# Patient Record
Sex: FEMALE | Race: WHITE | Employment: OTHER | URBAN - NONMETROPOLITAN AREA
[De-identification: names, ages, dates, MRNs, and addresses within clinical notes are randomized per-mention and may not be internally consistent; named-entity substitution may affect disease eponyms.]

---

## 2021-07-10 ENCOUNTER — HOSPITAL ENCOUNTER (INPATIENT)
Age: 76
LOS: 3 days | Discharge: HOME | DRG: 439 | End: 2021-07-13
Attending: FAMILY MEDICINE | Admitting: INTERNAL MEDICINE
Payer: MEDICARE

## 2021-07-10 ENCOUNTER — APPOINTMENT (OUTPATIENT)
Dept: CT IMAGING | Age: 76
DRG: 439 | End: 2021-07-10
Payer: MEDICARE

## 2021-07-10 DIAGNOSIS — I47.1 SVT (SUPRAVENTRICULAR TACHYCARDIA) (HCC): ICD-10-CM

## 2021-07-10 DIAGNOSIS — K85.00 IDIOPATHIC ACUTE PANCREATITIS, UNSPECIFIED COMPLICATION STATUS: Primary | ICD-10-CM

## 2021-07-10 LAB
-: ABNORMAL
ABSOLUTE EOS #: 0.1 K/UL (ref 0–0.4)
ABSOLUTE IMMATURE GRANULOCYTE: ABNORMAL K/UL (ref 0–0.3)
ABSOLUTE LYMPH #: 2 K/UL (ref 1–4.8)
ABSOLUTE MONO #: 1.2 K/UL (ref 0–1)
ALBUMIN SERPL-MCNC: 4.3 G/DL (ref 3.5–5.2)
ALBUMIN/GLOBULIN RATIO: ABNORMAL (ref 1–2.5)
ALP BLD-CCNC: 99 U/L (ref 35–104)
ALT SERPL-CCNC: 41 U/L (ref 5–33)
AMORPHOUS: ABNORMAL
ANION GAP SERPL CALCULATED.3IONS-SCNC: 15 MMOL/L (ref 9–17)
AST SERPL-CCNC: 89 U/L
BACTERIA: ABNORMAL
BASOPHILS # BLD: 0 % (ref 0–2)
BASOPHILS ABSOLUTE: 0.1 K/UL (ref 0–0.2)
BILIRUB SERPL-MCNC: 1.2 MG/DL (ref 0.3–1.2)
BILIRUBIN URINE: NEGATIVE
BUN BLDV-MCNC: 12 MG/DL (ref 8–23)
BUN/CREAT BLD: 17 (ref 9–20)
CALCIUM SERPL-MCNC: 9.4 MG/DL (ref 8.6–10.4)
CASTS UA: ABNORMAL /LPF
CHLORIDE BLD-SCNC: 104 MMOL/L (ref 98–107)
CO2: 22 MMOL/L (ref 20–31)
COLOR: ABNORMAL
COMMENT UA: ABNORMAL
CREAT SERPL-MCNC: 0.7 MG/DL (ref 0.5–0.9)
CRYSTALS, UA: ABNORMAL /HPF
DIFFERENTIAL TYPE: YES
EOSINOPHILS RELATIVE PERCENT: 1 % (ref 0–5)
EPITHELIAL CELLS UA: ABNORMAL /HPF
GFR AFRICAN AMERICAN: >60 ML/MIN
GFR NON-AFRICAN AMERICAN: >60 ML/MIN
GFR SERPL CREATININE-BSD FRML MDRD: ABNORMAL ML/MIN/{1.73_M2}
GFR SERPL CREATININE-BSD FRML MDRD: ABNORMAL ML/MIN/{1.73_M2}
GLUCOSE BLD-MCNC: 165 MG/DL (ref 70–99)
GLUCOSE URINE: NEGATIVE
HCT VFR BLD CALC: 43.3 % (ref 36–46)
HEMOGLOBIN: 14.5 G/DL (ref 12–16)
IMMATURE GRANULOCYTES: ABNORMAL %
INR BLD: 1.1
KETONES, URINE: NEGATIVE
LEUKOCYTE ESTERASE, URINE: NEGATIVE
LIPASE: >3000 U/L (ref 13–60)
LYMPHOCYTES # BLD: 11 % (ref 15–40)
MCH RBC QN AUTO: 28.9 PG (ref 26–34)
MCHC RBC AUTO-ENTMCNC: 33.5 G/DL (ref 31–37)
MCV RBC AUTO: 86.2 FL (ref 80–100)
MONOCYTES # BLD: 7 % (ref 4–8)
MUCUS: ABNORMAL
NITRITE, URINE: POSITIVE
NRBC AUTOMATED: ABNORMAL PER 100 WBC
OTHER OBSERVATIONS UA: ABNORMAL
PARTIAL THROMBOPLASTIN TIME: 30.4 SEC (ref 23.9–33.8)
PDW BLD-RTO: 13.1 % (ref 12.1–15.2)
PH UA: 5 (ref 5–8)
PLATELET # BLD: 578 K/UL (ref 140–450)
PLATELET ESTIMATE: ABNORMAL
PMV BLD AUTO: ABNORMAL FL (ref 6–12)
POTASSIUM SERPL-SCNC: 3.5 MMOL/L (ref 3.7–5.3)
PROTEIN UA: ABNORMAL
PROTHROMBIN TIME: 13.5 SEC (ref 11.5–14.2)
RBC # BLD: 5.03 M/UL (ref 4–5.2)
RBC # BLD: ABNORMAL 10*6/UL
RBC UA: ABNORMAL /HPF (ref 0–2)
RENAL EPITHELIAL, UA: ABNORMAL /HPF
SARS-COV-2, RAPID: NOT DETECTED
SEG NEUTROPHILS: 81 % (ref 47–75)
SEGMENTED NEUTROPHILS ABSOLUTE COUNT: 14.1 K/UL (ref 2.5–7)
SODIUM BLD-SCNC: 141 MMOL/L (ref 135–144)
SPECIFIC GRAVITY UA: 1.01 (ref 1–1.03)
SPECIMEN DESCRIPTION: NORMAL
TOTAL PROTEIN: 7.1 G/DL (ref 6.4–8.3)
TRICHOMONAS: ABNORMAL
TROPONIN INTERP: NORMAL
TROPONIN T: NORMAL NG/ML
TROPONIN, HIGH SENSITIVITY: 11 NG/L (ref 0–14)
TSH SERPL DL<=0.05 MIU/L-ACNC: 1.09 MIU/L (ref 0.3–5)
TURBIDITY: CLEAR
URINE HGB: NEGATIVE
UROBILINOGEN, URINE: NORMAL
WBC # BLD: 17.5 K/UL (ref 3.5–11)
WBC # BLD: ABNORMAL 10*3/UL
WBC UA: ABNORMAL /HPF
YEAST: ABNORMAL

## 2021-07-10 PROCEDURE — 84443 ASSAY THYROID STIM HORMONE: CPT

## 2021-07-10 PROCEDURE — 94761 N-INVAS EAR/PLS OXIMETRY MLT: CPT

## 2021-07-10 PROCEDURE — 87086 URINE CULTURE/COLONY COUNT: CPT

## 2021-07-10 PROCEDURE — 2580000003 HC RX 258: Performed by: FAMILY MEDICINE

## 2021-07-10 PROCEDURE — 85730 THROMBOPLASTIN TIME PARTIAL: CPT

## 2021-07-10 PROCEDURE — 85025 COMPLETE CBC W/AUTO DIFF WBC: CPT

## 2021-07-10 PROCEDURE — 6360000002 HC RX W HCPCS: Performed by: INTERNAL MEDICINE

## 2021-07-10 PROCEDURE — 99285 EMERGENCY DEPT VISIT HI MDM: CPT

## 2021-07-10 PROCEDURE — 87186 SC STD MICRODIL/AGAR DIL: CPT

## 2021-07-10 PROCEDURE — 1200000000 HC SEMI PRIVATE

## 2021-07-10 PROCEDURE — 80053 COMPREHEN METABOLIC PANEL: CPT

## 2021-07-10 PROCEDURE — 93005 ELECTROCARDIOGRAM TRACING: CPT | Performed by: FAMILY MEDICINE

## 2021-07-10 PROCEDURE — 6370000000 HC RX 637 (ALT 250 FOR IP): Performed by: INTERNAL MEDICINE

## 2021-07-10 PROCEDURE — 96376 TX/PRO/DX INJ SAME DRUG ADON: CPT

## 2021-07-10 PROCEDURE — 6360000004 HC RX CONTRAST MEDICATION: Performed by: FAMILY MEDICINE

## 2021-07-10 PROCEDURE — 87635 SARS-COV-2 COVID-19 AMP PRB: CPT

## 2021-07-10 PROCEDURE — 96374 THER/PROPH/DIAG INJ IV PUSH: CPT

## 2021-07-10 PROCEDURE — 87077 CULTURE AEROBIC IDENTIFY: CPT

## 2021-07-10 PROCEDURE — 84484 ASSAY OF TROPONIN QUANT: CPT

## 2021-07-10 PROCEDURE — 96375 TX/PRO/DX INJ NEW DRUG ADDON: CPT

## 2021-07-10 PROCEDURE — 87088 URINE BACTERIA CULTURE: CPT

## 2021-07-10 PROCEDURE — 6360000002 HC RX W HCPCS: Performed by: FAMILY MEDICINE

## 2021-07-10 PROCEDURE — 74177 CT ABD & PELVIS W/CONTRAST: CPT

## 2021-07-10 PROCEDURE — C9113 INJ PANTOPRAZOLE SODIUM, VIA: HCPCS | Performed by: FAMILY MEDICINE

## 2021-07-10 PROCEDURE — 81001 URINALYSIS AUTO W/SCOPE: CPT

## 2021-07-10 PROCEDURE — 83690 ASSAY OF LIPASE: CPT

## 2021-07-10 PROCEDURE — 85610 PROTHROMBIN TIME: CPT

## 2021-07-10 PROCEDURE — 2580000003 HC RX 258: Performed by: INTERNAL MEDICINE

## 2021-07-10 RX ORDER — MORPHINE SULFATE 2 MG/ML
1 INJECTION, SOLUTION INTRAMUSCULAR; INTRAVENOUS
Status: DISCONTINUED | OUTPATIENT
Start: 2021-07-10 | End: 2021-07-13 | Stop reason: HOSPADM

## 2021-07-10 RX ORDER — MORPHINE SULFATE 2 MG/ML
2 INJECTION, SOLUTION INTRAMUSCULAR; INTRAVENOUS EVERY 4 HOURS PRN
Status: DISCONTINUED | OUTPATIENT
Start: 2021-07-10 | End: 2021-07-10

## 2021-07-10 RX ORDER — LEVOTHYROXINE SODIUM 0.12 MG/1
125 TABLET ORAL DAILY
COMMUNITY

## 2021-07-10 RX ORDER — LEVOTHYROXINE AND LIOTHYRONINE 19; 4.5 UG/1; UG/1
30 TABLET ORAL DAILY
COMMUNITY

## 2021-07-10 RX ORDER — FENTANYL CITRATE 50 UG/ML
25 INJECTION, SOLUTION INTRAMUSCULAR; INTRAVENOUS ONCE
Status: COMPLETED | OUTPATIENT
Start: 2021-07-10 | End: 2021-07-10

## 2021-07-10 RX ORDER — SODIUM CHLORIDE 9 MG/ML
10 INJECTION INTRAVENOUS ONCE
Status: COMPLETED | OUTPATIENT
Start: 2021-07-10 | End: 2021-07-10

## 2021-07-10 RX ORDER — IBUPROFEN 200 MG
200 TABLET ORAL EVERY 6 HOURS PRN
COMMUNITY

## 2021-07-10 RX ORDER — SODIUM CHLORIDE 0.9 % (FLUSH) 0.9 %
5-40 SYRINGE (ML) INJECTION EVERY 12 HOURS SCHEDULED
Status: DISCONTINUED | OUTPATIENT
Start: 2021-07-10 | End: 2021-07-13 | Stop reason: HOSPADM

## 2021-07-10 RX ORDER — FENTANYL CITRATE 50 UG/ML
50 INJECTION, SOLUTION INTRAMUSCULAR; INTRAVENOUS ONCE
Status: COMPLETED | OUTPATIENT
Start: 2021-07-10 | End: 2021-07-10

## 2021-07-10 RX ORDER — SODIUM CHLORIDE 0.9 % (FLUSH) 0.9 %
5-40 SYRINGE (ML) INJECTION PRN
Status: DISCONTINUED | OUTPATIENT
Start: 2021-07-10 | End: 2021-07-13 | Stop reason: HOSPADM

## 2021-07-10 RX ORDER — LEVOTHYROXINE AND LIOTHYRONINE 19; 4.5 UG/1; UG/1
30 TABLET ORAL DAILY
Status: DISCONTINUED | OUTPATIENT
Start: 2021-07-10 | End: 2021-07-13 | Stop reason: HOSPADM

## 2021-07-10 RX ORDER — HYDRALAZINE HYDROCHLORIDE 20 MG/ML
10 INJECTION INTRAMUSCULAR; INTRAVENOUS EVERY 6 HOURS PRN
Status: DISCONTINUED | OUTPATIENT
Start: 2021-07-10 | End: 2021-07-13 | Stop reason: HOSPADM

## 2021-07-10 RX ORDER — PANTOPRAZOLE SODIUM 40 MG/10ML
40 INJECTION, POWDER, LYOPHILIZED, FOR SOLUTION INTRAVENOUS ONCE
Status: COMPLETED | OUTPATIENT
Start: 2021-07-10 | End: 2021-07-10

## 2021-07-10 RX ORDER — AMLODIPINE BESYLATE 10 MG/1
10 TABLET ORAL DAILY
COMMUNITY

## 2021-07-10 RX ORDER — SODIUM CHLORIDE 9 MG/ML
25 INJECTION, SOLUTION INTRAVENOUS PRN
Status: DISCONTINUED | OUTPATIENT
Start: 2021-07-10 | End: 2021-07-13 | Stop reason: HOSPADM

## 2021-07-10 RX ORDER — MORPHINE SULFATE 2 MG/ML
2 INJECTION, SOLUTION INTRAMUSCULAR; INTRAVENOUS
Status: DISCONTINUED | OUTPATIENT
Start: 2021-07-10 | End: 2021-07-13 | Stop reason: HOSPADM

## 2021-07-10 RX ORDER — ONDANSETRON 4 MG/1
4 TABLET, ORALLY DISINTEGRATING ORAL EVERY 8 HOURS PRN
Status: DISCONTINUED | OUTPATIENT
Start: 2021-07-10 | End: 2021-07-13 | Stop reason: HOSPADM

## 2021-07-10 RX ORDER — ACETAMINOPHEN 325 MG/1
650 TABLET ORAL EVERY 6 HOURS PRN
Status: DISCONTINUED | OUTPATIENT
Start: 2021-07-10 | End: 2021-07-13 | Stop reason: HOSPADM

## 2021-07-10 RX ORDER — LISINOPRIL 10 MG/1
10 TABLET ORAL DAILY
Status: DISCONTINUED | OUTPATIENT
Start: 2021-07-10 | End: 2021-07-13 | Stop reason: HOSPADM

## 2021-07-10 RX ORDER — POTASSIUM CHLORIDE 750 MG/1
40 TABLET, FILM COATED, EXTENDED RELEASE ORAL ONCE
Status: COMPLETED | OUTPATIENT
Start: 2021-07-10 | End: 2021-07-10

## 2021-07-10 RX ORDER — SODIUM CHLORIDE 9 MG/ML
INJECTION, SOLUTION INTRAVENOUS CONTINUOUS
Status: DISCONTINUED | OUTPATIENT
Start: 2021-07-10 | End: 2021-07-11

## 2021-07-10 RX ORDER — LISINOPRIL 10 MG/1
10 TABLET ORAL DAILY
COMMUNITY

## 2021-07-10 RX ORDER — MORPHINE SULFATE 2 MG/ML
2 INJECTION, SOLUTION INTRAMUSCULAR; INTRAVENOUS ONCE
Status: COMPLETED | OUTPATIENT
Start: 2021-07-10 | End: 2021-07-10

## 2021-07-10 RX ORDER — MORPHINE SULFATE 2 MG/ML
1 INJECTION, SOLUTION INTRAMUSCULAR; INTRAVENOUS EVERY 4 HOURS PRN
Status: DISCONTINUED | OUTPATIENT
Start: 2021-07-10 | End: 2021-07-10

## 2021-07-10 RX ORDER — ONDANSETRON 2 MG/ML
4 INJECTION INTRAMUSCULAR; INTRAVENOUS EVERY 6 HOURS PRN
Status: DISCONTINUED | OUTPATIENT
Start: 2021-07-10 | End: 2021-07-13 | Stop reason: HOSPADM

## 2021-07-10 RX ORDER — ACETAMINOPHEN 650 MG/1
650 SUPPOSITORY RECTAL EVERY 6 HOURS PRN
Status: DISCONTINUED | OUTPATIENT
Start: 2021-07-10 | End: 2021-07-13 | Stop reason: HOSPADM

## 2021-07-10 RX ORDER — AMLODIPINE BESYLATE 10 MG/1
10 TABLET ORAL DAILY
Status: DISCONTINUED | OUTPATIENT
Start: 2021-07-10 | End: 2021-07-13 | Stop reason: HOSPADM

## 2021-07-10 RX ORDER — POLYETHYLENE GLYCOL 3350 17 G/17G
17 POWDER, FOR SOLUTION ORAL DAILY PRN
Status: DISCONTINUED | OUTPATIENT
Start: 2021-07-10 | End: 2021-07-13 | Stop reason: HOSPADM

## 2021-07-10 RX ADMIN — IOPAMIDOL 75 ML: 755 INJECTION, SOLUTION INTRAVENOUS at 13:17

## 2021-07-10 RX ADMIN — PANTOPRAZOLE SODIUM 40 MG: 40 INJECTION, POWDER, FOR SOLUTION INTRAVENOUS at 12:22

## 2021-07-10 RX ADMIN — POTASSIUM CHLORIDE 40 MEQ: 750 TABLET, FILM COATED, EXTENDED RELEASE ORAL at 16:27

## 2021-07-10 RX ADMIN — FENTANYL CITRATE 50 MCG: 50 INJECTION, SOLUTION INTRAMUSCULAR; INTRAVENOUS at 12:19

## 2021-07-10 RX ADMIN — MORPHINE SULFATE 2 MG: 2 INJECTION, SOLUTION INTRAMUSCULAR; INTRAVENOUS at 16:24

## 2021-07-10 RX ADMIN — SODIUM CHLORIDE: 9 INJECTION, SOLUTION INTRAVENOUS at 16:27

## 2021-07-10 RX ADMIN — MORPHINE SULFATE 2 MG: 2 INJECTION, SOLUTION INTRAMUSCULAR; INTRAVENOUS at 20:29

## 2021-07-10 RX ADMIN — MORPHINE SULFATE 2 MG: 2 INJECTION, SOLUTION INTRAMUSCULAR; INTRAVENOUS at 17:55

## 2021-07-10 RX ADMIN — FENTANYL CITRATE 25 MCG: 50 INJECTION, SOLUTION INTRAMUSCULAR; INTRAVENOUS at 15:32

## 2021-07-10 RX ADMIN — PIPERACILLIN SODIUM AND TAZOBACTAM SODIUM 3375 MG: 3; .375 INJECTION, POWDER, LYOPHILIZED, FOR SOLUTION INTRAVENOUS at 16:27

## 2021-07-10 RX ADMIN — FENTANYL CITRATE 25 MCG: 50 INJECTION, SOLUTION INTRAMUSCULAR; INTRAVENOUS at 14:09

## 2021-07-10 RX ADMIN — SODIUM CHLORIDE 10 ML: 9 INJECTION, SOLUTION INTRAMUSCULAR; INTRAVENOUS; SUBCUTANEOUS at 12:23

## 2021-07-10 ASSESSMENT — PAIN DESCRIPTION - ORIENTATION
ORIENTATION: UPPER
ORIENTATION: MID;UPPER
ORIENTATION: UPPER

## 2021-07-10 ASSESSMENT — PAIN DESCRIPTION - LOCATION
LOCATION: ABDOMEN;SHOULDER
LOCATION: ABDOMEN

## 2021-07-10 ASSESSMENT — PAIN SCALES - GENERAL
PAINLEVEL_OUTOF10: 10
PAINLEVEL_OUTOF10: 7
PAINLEVEL_OUTOF10: 10
PAINLEVEL_OUTOF10: 6
PAINLEVEL_OUTOF10: 7
PAINLEVEL_OUTOF10: 10
PAINLEVEL_OUTOF10: 10
PAINLEVEL_OUTOF10: 2
PAINLEVEL_OUTOF10: 10

## 2021-07-10 ASSESSMENT — PAIN DESCRIPTION - DIRECTION
RADIATING_TOWARDS: RIGHT
RADIATING_TOWARDS: RIGHT

## 2021-07-10 ASSESSMENT — PAIN DESCRIPTION - PAIN TYPE
TYPE: ACUTE PAIN

## 2021-07-10 ASSESSMENT — PAIN SCALES - WONG BAKER: WONGBAKER_NUMERICALRESPONSE: 10

## 2021-07-10 NOTE — PROGRESS NOTES
Patient arrives to unit via cart. SBA to bed. Report received from [facility]. IVF initiated. Oriented to room, call light system, dry erase board. Nursing assessment complete. Vital signs obtained. Patient instructed of NPO status and voices understanding. Dr. Jami Sever notified of consult. Radiology notified of Gallbladder ultrasound order, to be completed tomorrow morning.

## 2021-07-10 NOTE — ED PROVIDER NOTES
eMERGENCY dEPARTMENT eNCOUnter      [address] [facility]    Chief Complaint   Patient presents with    Abdominal Pain     Pt to ED via [facility] with c/o abdominal pain x 2 weeks. C/o mid upper abd pain on and off. HPI    Sabrina Peralta is a 68 y.o. female who presents with epigastric and right upper quadrant abdominal pain for 2 weeks. Symptoms worsened this morning. Symptoms are described as severe. Patient states she has had decreased appetite over the past month. Patient is experiencing nausea with the abdominal pain. REVIEW OF SYSTEMS    All systems reviewed and positives are in the HPI. PAST MEDICAL HISTORY    Past Medical History:   Diagnosis Date    Hypertension     Thyroid disease        SURGICAL HISTORY    Past Surgical History:   Procedure Laterality Date    APPENDECTOMY      HYSTERECTOMY      JOINT REPLACEMENT         CURRENT MEDICATIONS    Current Outpatient Rx   Medication Sig Dispense Refill    ibuprofen (ADVIL;MOTRIN) 200 MG tablet Take 200 mg by mouth every 6 hours as needed for Pain      amLODIPine (NORVASC) 10 MG tablet Take 10 mg by mouth daily      lisinopril (PRINIVIL;ZESTRIL) 10 MG tablet Take 10 mg by mouth daily      levothyroxine (SYNTHROID) 125 MCG tablet Take 125 mcg by mouth Daily      thyroid (ARMOUR) 30 MG tablet Take 30 mg by mouth daily         ALLERGIES    No Known Allergies    FAMILY HISTORY    History reviewed. No pertinent family history.     SOCIAL HISTORY    Social History     Socioeconomic History    Marital status:      Spouse name: None    Number of children: None    Years of education: None    Highest education level: None   Occupational History    None   Tobacco Use    Smoking status: Never Smoker    Smokeless tobacco: Never Used   Substance and Sexual Activity    Alcohol use: None    Drug use: None    Sexual activity: None   Other Topics Concern    None   Social History Narrative    None     Social Determinants of Health surrounding fluid. Slight intrahepatic and mild external hepatic ductal dilatation is noted without obvious stones. Mild diverticulosis of the sigmoid colon without diverticulitis. ED COURSE & MEDICAL DECISION MAKING    Pertinent Labs & Imaging studies reviewed. (See chart for details)   Patient will be admitted. I discussed this with Dr. Narinder Bradford, and he accepted admission. Labs Reviewed   CBC WITH AUTO DIFFERENTIAL - Abnormal; Notable for the following components:       Result Value    WBC 17.5 (*)     Platelets 655 (*)     Seg Neutrophils 81 (*)     Lymphocytes 11 (*)     Segs Absolute 14.10 (*)     Absolute Mono # 1.20 (*)     All other components within normal limits   COMPREHENSIVE METABOLIC PANEL - Abnormal; Notable for the following components:    Glucose 165 (*)     Potassium 3.5 (*)     ALT 41 (*)     AST 89 (*)     All other components within normal limits   LIPASE - Abnormal; Notable for the following components:    Lipase >3,000 (*)     All other components within normal limits   TROPONIN   TSH WITHOUT REFLEX   PROTIME-INR   APTT   URINALYSIS       FINAL IMPRESSION    1. Acute pancreatitis  2. Summation      Patient Course: Patient will be admitted. I discussed this with Dr. Narinder Bradford, and he accepted admission. ED Medications administered this visit:    Medications   fentaNYL (SUBLIMAZE) injection 50 mcg (50 mcg Intravenous Given 7/10/21 1219)   pantoprazole (PROTONIX) injection 40 mg (40 mg Intravenous Given 7/10/21 1222)     And   sodium chloride (PF) 0.9 % injection 10 mL (10 mLs Intravenous Given 7/10/21 1223)   iopamidol (ISOVUE-370) 76 % injection 75 mL (75 mLs Intravenous Given 7/10/21 1317)   fentaNYL (SUBLIMAZE) injection 25 mcg (25 mcg Intravenous Given 7/10/21 1409)       New Prescriptions from this visit:    New Prescriptions    No medications on file       Follow-up:  No follow-up provider specified. Final Impression:   1.  Idiopathic acute pancreatitis, unspecified complication status               (Please note that portions of this note were completed with a voice recognition program.  Efforts were made to edit the dictations but occasionally words are mis-transcribed.)     Steph Bond MD  07/10/21 U Nandini Guaman MD  07/10/21 [id number]

## 2021-07-10 NOTE — PROGRESS NOTES
Dr. Hayley Mclaughlin notified of c/o uncontrolled pain 10/10 RUQ. See new orders. Patient satisfied.

## 2021-07-11 ENCOUNTER — APPOINTMENT (OUTPATIENT)
Dept: ULTRASOUND IMAGING | Age: 76
DRG: 439 | End: 2021-07-11
Payer: MEDICARE

## 2021-07-11 LAB
ABSOLUTE EOS #: 0.1 K/UL (ref 0–0.4)
ABSOLUTE IMMATURE GRANULOCYTE: ABNORMAL K/UL (ref 0–0.3)
ABSOLUTE LYMPH #: 1.1 K/UL (ref 1–4.8)
ABSOLUTE MONO #: 0.9 K/UL (ref 0–1)
ALBUMIN SERPL-MCNC: 3.2 G/DL (ref 3.5–5.2)
ALBUMIN/GLOBULIN RATIO: ABNORMAL (ref 1–2.5)
ALP BLD-CCNC: 88 U/L (ref 35–104)
ALT SERPL-CCNC: 120 U/L (ref 5–33)
ANION GAP SERPL CALCULATED.3IONS-SCNC: 9 MMOL/L (ref 9–17)
AST SERPL-CCNC: 126 U/L
BASOPHILS # BLD: 0 % (ref 0–2)
BASOPHILS ABSOLUTE: 0 K/UL (ref 0–0.2)
BILIRUB SERPL-MCNC: 0.57 MG/DL (ref 0.3–1.2)
BUN BLDV-MCNC: 15 MG/DL (ref 8–23)
BUN/CREAT BLD: 19 (ref 9–20)
CALCIUM SERPL-MCNC: 8.2 MG/DL (ref 8.6–10.4)
CHLORIDE BLD-SCNC: 112 MMOL/L (ref 98–107)
CHOLESTEROL/HDL RATIO: 2.6
CHOLESTEROL: 117 MG/DL
CO2: 24 MMOL/L (ref 20–31)
CREAT SERPL-MCNC: 0.77 MG/DL (ref 0.5–0.9)
DIFFERENTIAL TYPE: YES
EKG ATRIAL RATE: 70 BPM
EKG P AXIS: 56 DEGREES
EKG P-R INTERVAL: 144 MS
EKG Q-T INTERVAL: 440 MS
EKG QRS DURATION: 74 MS
EKG QTC CALCULATION (BAZETT): 475 MS
EKG R AXIS: 25 DEGREES
EKG T AXIS: 79 DEGREES
EKG VENTRICULAR RATE: 70 BPM
EOSINOPHILS RELATIVE PERCENT: 0 % (ref 0–5)
GFR AFRICAN AMERICAN: >60 ML/MIN
GFR NON-AFRICAN AMERICAN: >60 ML/MIN
GFR SERPL CREATININE-BSD FRML MDRD: ABNORMAL ML/MIN/{1.73_M2}
GFR SERPL CREATININE-BSD FRML MDRD: ABNORMAL ML/MIN/{1.73_M2}
GLUCOSE BLD-MCNC: 112 MG/DL (ref 70–99)
HCT VFR BLD CALC: 38.5 % (ref 36–46)
HDLC SERPL-MCNC: 45 MG/DL
HEMOGLOBIN: 13.1 G/DL (ref 12–16)
IMMATURE GRANULOCYTES: ABNORMAL %
LDL CHOLESTEROL: 56 MG/DL (ref 0–130)
LIPASE: 1117 U/L (ref 13–60)
LYMPHOCYTES # BLD: 9 % (ref 15–40)
MCH RBC QN AUTO: 29.3 PG (ref 26–34)
MCHC RBC AUTO-ENTMCNC: 34.2 G/DL (ref 31–37)
MCV RBC AUTO: 85.8 FL (ref 80–100)
MONOCYTES # BLD: 7 % (ref 4–8)
NRBC AUTOMATED: ABNORMAL PER 100 WBC
PDW BLD-RTO: 13.4 % (ref 12.1–15.2)
PLATELET # BLD: 428 K/UL (ref 140–450)
PLATELET ESTIMATE: ABNORMAL
PMV BLD AUTO: ABNORMAL FL (ref 6–12)
POTASSIUM SERPL-SCNC: 4.2 MMOL/L (ref 3.7–5.3)
RBC # BLD: 4.48 M/UL (ref 4–5.2)
RBC # BLD: ABNORMAL 10*6/UL
SEG NEUTROPHILS: 84 % (ref 47–75)
SEGMENTED NEUTROPHILS ABSOLUTE COUNT: 10.1 K/UL (ref 2.5–7)
SODIUM BLD-SCNC: 145 MMOL/L (ref 135–144)
TOTAL PROTEIN: 5.7 G/DL (ref 6.4–8.3)
TRIGL SERPL-MCNC: 80 MG/DL
VLDLC SERPL CALC-MCNC: NORMAL MG/DL (ref 1–30)
WBC # BLD: 12.2 K/UL (ref 3.5–11)
WBC # BLD: ABNORMAL 10*3/UL

## 2021-07-11 PROCEDURE — 93010 ELECTROCARDIOGRAM REPORT: CPT | Performed by: INTERNAL MEDICINE

## 2021-07-11 PROCEDURE — 80061 LIPID PANEL: CPT

## 2021-07-11 PROCEDURE — 6360000002 HC RX W HCPCS: Performed by: INTERNAL MEDICINE

## 2021-07-11 PROCEDURE — 6370000000 HC RX 637 (ALT 250 FOR IP): Performed by: INTERNAL MEDICINE

## 2021-07-11 PROCEDURE — 2580000003 HC RX 258: Performed by: INTERNAL MEDICINE

## 2021-07-11 PROCEDURE — 85025 COMPLETE CBC W/AUTO DIFF WBC: CPT

## 2021-07-11 PROCEDURE — 76705 ECHO EXAM OF ABDOMEN: CPT

## 2021-07-11 PROCEDURE — 36415 COLL VENOUS BLD VENIPUNCTURE: CPT

## 2021-07-11 PROCEDURE — 80053 COMPREHEN METABOLIC PANEL: CPT

## 2021-07-11 PROCEDURE — 94761 N-INVAS EAR/PLS OXIMETRY MLT: CPT

## 2021-07-11 PROCEDURE — 1200000000 HC SEMI PRIVATE

## 2021-07-11 PROCEDURE — 94640 AIRWAY INHALATION TREATMENT: CPT

## 2021-07-11 PROCEDURE — 99221 1ST HOSP IP/OBS SF/LOW 40: CPT | Performed by: SURGERY

## 2021-07-11 PROCEDURE — 83690 ASSAY OF LIPASE: CPT

## 2021-07-11 RX ORDER — DEXTROSE AND SODIUM CHLORIDE 5; .45 G/100ML; G/100ML
INJECTION, SOLUTION INTRAVENOUS CONTINUOUS
Status: DISCONTINUED | OUTPATIENT
Start: 2021-07-11 | End: 2021-07-13 | Stop reason: HOSPADM

## 2021-07-11 RX ADMIN — DEXTROSE AND SODIUM CHLORIDE: 5; 450 INJECTION, SOLUTION INTRAVENOUS at 07:39

## 2021-07-11 RX ADMIN — MORPHINE SULFATE 2 MG: 2 INJECTION, SOLUTION INTRAMUSCULAR; INTRAVENOUS at 05:34

## 2021-07-11 RX ADMIN — AMLODIPINE BESYLATE 10 MG: 10 TABLET ORAL at 07:40

## 2021-07-11 RX ADMIN — PIPERACILLIN SODIUM AND TAZOBACTAM SODIUM 3375 MG: 3; .375 INJECTION, POWDER, LYOPHILIZED, FOR SOLUTION INTRAVENOUS at 16:01

## 2021-07-11 RX ADMIN — ENOXAPARIN SODIUM 40 MG: 40 INJECTION SUBCUTANEOUS at 08:00

## 2021-07-11 RX ADMIN — PIPERACILLIN SODIUM AND TAZOBACTAM SODIUM 3375 MG: 3; .375 INJECTION, POWDER, LYOPHILIZED, FOR SOLUTION INTRAVENOUS at 07:39

## 2021-07-11 RX ADMIN — DEXTROSE AND SODIUM CHLORIDE: 5; 450 INJECTION, SOLUTION INTRAVENOUS at 18:34

## 2021-07-11 RX ADMIN — MORPHINE SULFATE 2 MG: 2 INJECTION, SOLUTION INTRAMUSCULAR; INTRAVENOUS at 00:17

## 2021-07-11 RX ADMIN — LISINOPRIL 10 MG: 10 TABLET ORAL at 07:40

## 2021-07-11 RX ADMIN — MORPHINE SULFATE 1 MG: 2 INJECTION, SOLUTION INTRAMUSCULAR; INTRAVENOUS at 20:14

## 2021-07-11 RX ADMIN — PIPERACILLIN SODIUM AND TAZOBACTAM SODIUM 3375 MG: 3; .375 INJECTION, POWDER, LYOPHILIZED, FOR SOLUTION INTRAVENOUS at 00:17

## 2021-07-11 RX ADMIN — LEVOTHYROXINE SODIUM 125 MCG: 25 TABLET ORAL at 05:28

## 2021-07-11 RX ADMIN — MORPHINE SULFATE 1 MG: 2 INJECTION, SOLUTION INTRAMUSCULAR; INTRAVENOUS at 16:02

## 2021-07-11 RX ADMIN — SODIUM CHLORIDE: 9 INJECTION, SOLUTION INTRAVENOUS at 02:02

## 2021-07-11 RX ADMIN — LEVOTHYROXINE, LIOTHYRONINE 30 MG: 19; 4.5 TABLET ORAL at 20:04

## 2021-07-11 ASSESSMENT — PAIN SCALES - GENERAL
PAINLEVEL_OUTOF10: 7
PAINLEVEL_OUTOF10: 0
PAINLEVEL_OUTOF10: 7
PAINLEVEL_OUTOF10: 6
PAINLEVEL_OUTOF10: 7
PAINLEVEL_OUTOF10: 6
PAINLEVEL_OUTOF10: 6

## 2021-07-11 ASSESSMENT — PAIN DESCRIPTION - PAIN TYPE
TYPE: ACUTE PAIN

## 2021-07-11 ASSESSMENT — PAIN DESCRIPTION - LOCATION
LOCATION: ABDOMEN
LOCATION: ABDOMEN
LOCATION: SHOULDER

## 2021-07-11 ASSESSMENT — PAIN DESCRIPTION - ORIENTATION: ORIENTATION: UPPER

## 2021-07-11 ASSESSMENT — PAIN DESCRIPTION - DIRECTION: RADIATING_TOWARDS: RIGHT

## 2021-07-11 NOTE — PROGRESS NOTES
Patient rates RUQ and upper abd pain 7/10. States, \"it's comfortable laying here, it's not that bad. \"

## 2021-07-11 NOTE — PLAN OF CARE
Problem: Falls - Risk of:  Goal: Will remain free from falls  Description: Will remain free from falls  7/11/2021 1025 by Joseslyn Girard RN  Outcome: Met This Shift  7/11/2021 0301 by Ben Lopez RN  Outcome: Ongoing  Goal: Absence of physical injury  Description: Absence of physical injury  7/11/2021 1025 by Josselyn Girard RN  Outcome: Met This Shift  7/11/2021 0301 by Ben Lopez RN  Outcome: Ongoing     Problem: Pain:  Goal: Pain level will decrease  Description: Pain level will decrease  7/11/2021 1025 by Josselyn Girard RN  Outcome: Met This Shift  7/11/2021 0301 by Ben Lopez RN  Outcome: Ongoing     Problem: Pain:  Goal: Control of acute pain  Description: Control of acute pain  7/11/2021 0301 by Ben Lopez RN  Outcome: Ongoing     Problem: Fluid Volume:  Goal: Will maintain adequate fluid volume  Description: Will maintain adequate fluid volume  7/11/2021 0303 by Ben Lopez RN  Outcome: Ongoing     Problem: Nutritional:  Goal: Ability to achieve adequate nutritional intake will improve  Description: Ability to achieve adequate nutritional intake will improve  7/11/2021 0303 by Ben Lopez RN  Outcome: Ongoing     Problem: Physical Regulation:  Goal: Complications related to the disease process, condition or treatment will be avoided or minimized  Description: Complications related to the disease process, condition or treatment will be avoided or minimized  7/11/2021 0303 by Ben Lopez RN  Outcome: Ongoing  Goal: Hemodynamic stability will improve  Description: Hemodynamic stability will improve  7/11/2021 0303 by Ben Lopez RN  Outcome: Ongoing     Problem: Sensory:  Goal: General experience of comfort will improve  Description: General experience of comfort will improve  7/11/2021 0303 by Ben Lopez RN  Outcome: Ongoing     Problem: Skin Integrity:  Goal: Skin integrity will be maintained  Description: Skin integrity will be maintained  7/11/2021 0303 by Ben Lopez RN  Outcome: Ongoing

## 2021-07-11 NOTE — H&P
Hospital Medicine  History and Physical    Patient:  Audrey Cutler  MRN: [id number]    CHIEF COMPLAINT:  Abdominal pain, hurts to breath, and nausea / vomiting    History Obtained From:  patient  PCP: MARISELA RUIZ    HISTORY OF PRESENT ILLNESS:   The patient is a 68 y.o. female who presents to the ER with severe epigastric abdominal pain and pain with breathing. According to [facility], for the past month she has not had much of an appetite. For the past 2 weeks she has been experiencing episodes of Epigastric abdominal pain with fullness across her upper abdomen. She felt this was secondary to constipation so she had been taking a lot of laxative over the past 2 weeks. This would result in a BM which would help with her pain. Over this time she states she was only eating once a day which was usually something like soup. Eating would make the fullness worse as it seemed \"things weren't moving through\". During this time she did have episodes of nausea and vomiting, usually vomiting up what she ate. [facility] states yesterday the pain became much worse and consistent. She had a small BM with no improvement. [facility] denies fever or chill but did feel nauseated. She states she was unable to take a deep breath as this would cause sharp low chest / upper abdominal pain. \"My son Jayna finally convinced me to come to the ER\". [facility] denies a  History of pancreatitis. She denies drinking any alcohol and states she has not had a history of elevated triglycerides. In the ER her CMP was normal other than a potassium of 3.5, glucose 165, ALT 41, and AST 89. CBC showed a WBC of 17.5, Hgb 14.5, and Plt ct of 578. TSH was 1.09. Lipase was > 3000. UA showed positive nitrite with 0 to 2 WBC but 5 to 10 epi cells. CT of the abdomen and pelvis showed:   1. Moderate to prominent pancreatitis focusing mostly in the vicinity of the   pancreatic head. No loculated fluid collections or pancreatic ductal dilatation   is noted.      2. The gallbladder is grossly distended with mild to moderate surrounding   fluid. Slight intrahepatic and mild external hepatic ductal dilatation is noted   without obvious stones. 3. Mild diverticulosis of the sigmoid colon without diverticulitis. After admission an [facility] showed:  1. Cholelithiasis. Diffuse gallbladder wall thickening and gallbladder wall   edema with elicited sonographic Jernigan's sign, likely acute cholecystitis. 2. Mild biliary ductal dilatation with the common bile duct measuring 0.9 cm in   diameter. 3. Right renal cyst.     [employer] was treated with IV pain medication in the ER which helped to control her pain. It was felt [employer] needed admission for bowel rest, IV hydration, and pain control with a consult to General Surgery. Past Medical History:        Diagnosis Date    Hypertension     Thyroid disease        Past Surgical History:        Procedure Laterality Date    APPENDECTOMY      HYSTERECTOMY      JOINT REPLACEMENT         Medications Prior to Admission:  I obtained, documented, reviewed, and updated the patient's current medications. Prior to Admission medications    Medication Sig Start Date End Date Taking? Authorizing Provider   ibuprofen (ADVIL;MOTRIN) 200 MG tablet Take 200 mg by mouth every 6 hours as needed for Pain   Yes Historical Provider, MD   amLODIPine (NORVASC) 10 MG tablet Take 10 mg by mouth daily   Yes Historical Provider, MD   lisinopril (PRINIVIL;ZESTRIL) 10 MG tablet Take 10 mg by mouth daily   Yes Historical Provider, MD   levothyroxine (SYNTHROID) 125 MCG tablet Take 125 mcg by mouth Daily   Yes Historical Provider, MD   thyroid (ARMOUR) 30 MG tablet Take 30 mg by mouth daily   Yes Historical Provider, MD       Allergies:  Patient has no known allergies. Social History:   TOBACCO:   reports that she has never smoked. She has never used smokeless tobacco.  ETOH:   has no history on file for alcohol use.   OCCUPATION:      Family History: History reviewed. No pertinent family history. REVIEW OF SYSTEMS:  Constitutional:  positive for  Malaise and fatigue for the past month.  negative for  fevers and chills  HEENT:  negative for  hearing loss, ear drainage, earaches, nasal congestion and sore throat  Neck:  No lumps or masses  Cardiac:  positive for  Low chest pain that is sharp with deep breathing  negative for  palpitations, orthopnea, PND  Respiratory:  positive for  dyspnea and pleuritic pain  Gastrointestinal:  positive for nausea, vomiting, constipation and abdominal pain  negative for jaundice, hematemesis and hemtochezia  :  negative for frequency, dysuria and hesitancy  Musculoskeletal:  negative for  myalgias, arthralgias and joint swelling  Neuro:  negative      Physical Exam:    Vitals: /72   Pulse 87   Temp 98.4 °F (36.9 °C) (Oral)   Resp 20   Ht 5' 10\" (1.778 m)   Wt 175 lb (79.4 kg)   SpO2 95%   BMI 25.11 kg/m²   General appearance: alert, appears stated age and cooperative  Skin: Skin color, texture, turgor normal. No rashes or lesions  HEENT: Head: Normocephalic, no lesions, without obvious abnormality. Eye: Normal external eye, conjunctiva, lids cornea, DULCE. Ears: Normal TM's bilaterally. Normal auditory canals and external ears. Non-tender. Nose: Normal external nose, mucus membranes and septum. Pharynx: Dental Hygiene adequate. Normal buccal mucosa. Normal pharynx. Neck: no adenopathy, no carotid bruit and supple, symmetrical, trachea midline  Lungs: clear to auscultation bilaterally  Heart: regular rate and rhythm, S1, S2 normal, no murmur, click, rub or gallop  Abdomen: Hypoactive bowel sounds, soft, non distended. Discomfort with palpation over the upper abdomen (all the way across) with no rebound or guarding.   Extremities: extremities normal, atraumatic, no cyanosis or edema  Neurologic: Mental status: Alert, oriented, thought content appropriate    CBC:   Recent Labs     07/10/21  1206 07/11/21  0615   WBC 17.5* 12.2*   HGB 14.5 13.1   * 428     BMP:    Recent Labs     07/10/21  1206 07/11/21  0615    145*   K 3.5* 4.2    112*   CO2 22 24   BUN 12 15   CREATININE 0.70 0.77   GLUCOSE 165* 112*     Hepatic:   Recent Labs     07/10/21  1206 07/11/21  0615   AST 89* 126*   ALT 41* 120*   BILITOT 1.20 0.57   ALKPHOS 99 88     Troponin: No results for input(s): TROPONINI in the last 72 hours. BNP: No results for input(s): BNP in the last 72 hours. Lipids: No results for input(s): CHOL, HDL in the last 72 hours. Invalid input(s): LDLCALCU  ABGs: No results found for: PHART, PO2ART, LPV6GRA  INR:   Recent Labs     07/10/21  1206   INR 1.1     -----------------------------------------------------------------      Assessment and Plan   1. Acute Pancreatitis - likely gallstone pancreatitis. Cholelithiasis seen on US. Does not appear to be occlusive since the total bilirubin level is not elevated and the common bile duct is only mildly dilated. On IV Morphine for pain control. 2.  Acute Cholecystitis - on IV Zosyn, bowel rest, and IV hydration. 3.  Intractable abdominal pain - on IV Morphine Q2 PRN. On end tidal CO2 monitor and cardiac telemetry. 4.  Hypokalemia - replaced. 5.  Nausea / vomiting - on Zosyn PRN. 6.  Hypothyroidism - controlled on Levothyroxine / [place] thyroid. Follows with Endocrinology. 7. HTN - controlled on Amlodipine and Lisinopril with PRN IV Hydralazine. 8.  Pleuritic chest pain secondary to inflammation for GB / Pancrease. Plan:  1. Admit to the hospital on cardiac telemetry / end tidal CO2 monitor. 2.  IV Fluid hydration. 3.  IV Morphine PRN. 4.  Consult Dr. Joss Wright in General surgery. 5.  NPO other than sips with medication. 6.  Potassium replaced upon admission. 7.  Continue home medication with sips of water. 8.  Lovenox for DVT prophylaxis. 9.  Full code. Medical Necessity:   In patient is appropriate for this patient secondary to the need for bowel rest, IV hydration, IV pain medication, and surgical evaluation. Estimated length of stay 3 days. The beneficiary may reasonably be expected to be discharged or transferred to a hospital within 96 hours after admission. Patient Active Problem List   Diagnosis Code    Idiopathic acute pancreatitis K85.00       DVT prophylaxis:   [x] Lovenox   [] SCDs   [] SQ Heparin   [] Encourage ambulation, low risk for DVT, no chemical or mechanical    prophylaxis necessary      [] Already on Anticoagulation      Documentation of the Current Medications in the Medical Record    (x)  I have utilized all available immediate resources to obtain, update, or review the patient's current medications. (Satisfies MIPS Performance)  If Yes, Stop Here  ( )  The patient is not eligible for medication reconciliation; the patient is in an emergent medical situation where delaying treatment would jeopardize the patient's health. (MIPS Performance exception / exclusion)  ( )  I did not confirm, update or review the patient's current list of medications today. (Does not satisfy MIPS Performance)      Advanced Care Plan    (x)  I confirmed that the patient's Advanced Care Plan is present, code status documented, or surrogate decision maker is listed in the patient's medical record. ( )  The patient's advanced care plan is not present because:  (select)   ( ) I confirmed today that the patient does not wish or was not able to name a surrogate decision maker or provide an [address] ( ) Hospice care is currently being provided or has been provided this calender year. ( )  I did not confirm today the presence of an [address] or surrogate decision maker documented within the patient's medical record. (Does not satisfy MIPS performance).           Ferdinand Garcia MD, MD  Admitting Hospitalist

## 2021-07-11 NOTE — PLAN OF CARE
Problem: Falls - Risk of:  Goal: Will remain free from falls  Description: Will remain free from falls  Outcome: Ongoing  Goal: Absence of physical injury  Description: Absence of physical injury  Outcome: Ongoing     Problem: Pain:  Goal: Pain level will decrease  Description: Pain level will decrease  Outcome: Ongoing  Goal: Control of acute pain  Description: Control of acute pain  Outcome: Ongoing     Problem: Fluid Volume:  Goal: Will maintain adequate fluid volume  Description: Will maintain adequate fluid volume  Outcome: Ongoing     Problem: Nutritional:  Goal: Ability to achieve adequate nutritional intake will improve  Description: Ability to achieve adequate nutritional intake will improve  Outcome: Ongoing     Problem: Physical Regulation:  Goal: Complications related to the disease process, condition or treatment will be avoided or minimized  Description: Complications related to the disease process, condition or treatment will be avoided or minimized  Outcome: Ongoing  Goal: Hemodynamic stability will improve  Description: Hemodynamic stability will improve  Outcome: Ongoing     Problem: Sensory:  Goal: General experience of comfort will improve  Description: General experience of comfort will improve  Outcome: Ongoing     Problem: Skin Integrity:  Goal: Skin integrity will be maintained  Description: Skin integrity will be maintained  Outcome: Ongoing

## 2021-07-12 PROBLEM — E44.1 MILD MALNUTRITION (HCC): Status: ACTIVE | Noted: 2021-07-12

## 2021-07-12 LAB
ABSOLUTE EOS #: 0.1 K/UL (ref 0–0.4)
ABSOLUTE IMMATURE GRANULOCYTE: ABNORMAL K/UL (ref 0–0.3)
ABSOLUTE LYMPH #: 0.8 K/UL (ref 1–4.8)
ABSOLUTE MONO #: 1.2 K/UL (ref 0–1)
ALBUMIN SERPL-MCNC: 2.7 G/DL (ref 3.5–5.2)
ALBUMIN/GLOBULIN RATIO: ABNORMAL (ref 1–2.5)
ALP BLD-CCNC: 76 U/L (ref 35–104)
ALT SERPL-CCNC: 70 U/L (ref 5–33)
ANION GAP SERPL CALCULATED.3IONS-SCNC: 7 MMOL/L (ref 9–17)
AST SERPL-CCNC: 45 U/L
BASOPHILS # BLD: 0 % (ref 0–2)
BASOPHILS ABSOLUTE: 0 K/UL (ref 0–0.2)
BILIRUB SERPL-MCNC: 0.73 MG/DL (ref 0.3–1.2)
BUN BLDV-MCNC: 12 MG/DL (ref 8–23)
BUN/CREAT BLD: 18 (ref 9–20)
CALCIUM SERPL-MCNC: 7.7 MG/DL (ref 8.6–10.4)
CHLORIDE BLD-SCNC: 106 MMOL/L (ref 98–107)
CO2: 24 MMOL/L (ref 20–31)
CREAT SERPL-MCNC: 0.65 MG/DL (ref 0.5–0.9)
DIFFERENTIAL TYPE: YES
EOSINOPHILS RELATIVE PERCENT: 1 % (ref 0–5)
GFR AFRICAN AMERICAN: >60 ML/MIN
GFR NON-AFRICAN AMERICAN: >60 ML/MIN
GFR SERPL CREATININE-BSD FRML MDRD: ABNORMAL ML/MIN/{1.73_M2}
GFR SERPL CREATININE-BSD FRML MDRD: ABNORMAL ML/MIN/{1.73_M2}
GLUCOSE BLD-MCNC: 140 MG/DL (ref 70–99)
HCT VFR BLD CALC: 34.3 % (ref 36–46)
HEMOGLOBIN: 11.7 G/DL (ref 12–16)
IMMATURE GRANULOCYTES: ABNORMAL %
LIPASE: 260 U/L (ref 13–60)
LYMPHOCYTES # BLD: 5 % (ref 15–40)
MCH RBC QN AUTO: 29.1 PG (ref 26–34)
MCHC RBC AUTO-ENTMCNC: 34.1 G/DL (ref 31–37)
MCV RBC AUTO: 85.3 FL (ref 80–100)
MONOCYTES # BLD: 8 % (ref 4–8)
NRBC AUTOMATED: ABNORMAL PER 100 WBC
PDW BLD-RTO: 13.4 % (ref 12.1–15.2)
PLATELET # BLD: 328 K/UL (ref 140–450)
PLATELET ESTIMATE: ABNORMAL
PMV BLD AUTO: ABNORMAL FL (ref 6–12)
POTASSIUM SERPL-SCNC: 3.6 MMOL/L (ref 3.7–5.3)
RBC # BLD: 4.03 M/UL (ref 4–5.2)
RBC # BLD: ABNORMAL 10*6/UL
SEG NEUTROPHILS: 86 % (ref 47–75)
SEGMENTED NEUTROPHILS ABSOLUTE COUNT: 12.8 K/UL (ref 2.5–7)
SODIUM BLD-SCNC: 137 MMOL/L (ref 135–144)
TOTAL PROTEIN: 5.1 G/DL (ref 6.4–8.3)
WBC # BLD: 14.9 K/UL (ref 3.5–11)
WBC # BLD: ABNORMAL 10*3/UL

## 2021-07-12 PROCEDURE — 83690 ASSAY OF LIPASE: CPT

## 2021-07-12 PROCEDURE — 80053 COMPREHEN METABOLIC PANEL: CPT

## 2021-07-12 PROCEDURE — 94761 N-INVAS EAR/PLS OXIMETRY MLT: CPT

## 2021-07-12 PROCEDURE — 1200000000 HC SEMI PRIVATE

## 2021-07-12 PROCEDURE — 6370000000 HC RX 637 (ALT 250 FOR IP): Performed by: INTERNAL MEDICINE

## 2021-07-12 PROCEDURE — 2580000003 HC RX 258: Performed by: INTERNAL MEDICINE

## 2021-07-12 PROCEDURE — 85025 COMPLETE CBC W/AUTO DIFF WBC: CPT

## 2021-07-12 PROCEDURE — 36415 COLL VENOUS BLD VENIPUNCTURE: CPT

## 2021-07-12 PROCEDURE — 94640 AIRWAY INHALATION TREATMENT: CPT

## 2021-07-12 PROCEDURE — 99222 1ST HOSP IP/OBS MODERATE 55: CPT | Performed by: INTERNAL MEDICINE

## 2021-07-12 PROCEDURE — 6360000002 HC RX W HCPCS: Performed by: INTERNAL MEDICINE

## 2021-07-12 RX ORDER — POTASSIUM CHLORIDE 750 MG/1
20 TABLET, FILM COATED, EXTENDED RELEASE ORAL 2 TIMES DAILY
Status: DISCONTINUED | OUTPATIENT
Start: 2021-07-12 | End: 2021-07-13 | Stop reason: HOSPADM

## 2021-07-12 RX ADMIN — ENOXAPARIN SODIUM 40 MG: 40 INJECTION SUBCUTANEOUS at 08:41

## 2021-07-12 RX ADMIN — LISINOPRIL 10 MG: 10 TABLET ORAL at 08:41

## 2021-07-12 RX ADMIN — DEXTROSE AND SODIUM CHLORIDE: 5; 450 INJECTION, SOLUTION INTRAVENOUS at 03:15

## 2021-07-12 RX ADMIN — LEVOTHYROXINE, LIOTHYRONINE 30 MG: 19; 4.5 TABLET ORAL at 19:51

## 2021-07-12 RX ADMIN — PIPERACILLIN SODIUM AND TAZOBACTAM SODIUM 3375 MG: 3; .375 INJECTION, POWDER, LYOPHILIZED, FOR SOLUTION INTRAVENOUS at 00:10

## 2021-07-12 RX ADMIN — MORPHINE SULFATE 1 MG: 2 INJECTION, SOLUTION INTRAMUSCULAR; INTRAVENOUS at 03:14

## 2021-07-12 RX ADMIN — SODIUM CHLORIDE, PRESERVATIVE FREE 10 ML: 5 INJECTION INTRAVENOUS at 08:42

## 2021-07-12 RX ADMIN — PIPERACILLIN SODIUM AND TAZOBACTAM SODIUM 3375 MG: 3; .375 INJECTION, POWDER, LYOPHILIZED, FOR SOLUTION INTRAVENOUS at 08:42

## 2021-07-12 RX ADMIN — DEXTROSE AND SODIUM CHLORIDE: 5; 450 INJECTION, SOLUTION INTRAVENOUS at 13:21

## 2021-07-12 RX ADMIN — ACETAMINOPHEN 650 MG: 325 TABLET, FILM COATED ORAL at 12:25

## 2021-07-12 RX ADMIN — LEVOTHYROXINE SODIUM 125 MCG: 25 TABLET ORAL at 06:16

## 2021-07-12 RX ADMIN — SODIUM CHLORIDE, PRESERVATIVE FREE 10 ML: 5 INJECTION INTRAVENOUS at 19:51

## 2021-07-12 RX ADMIN — MORPHINE SULFATE 1 MG: 2 INJECTION, SOLUTION INTRAMUSCULAR; INTRAVENOUS at 08:46

## 2021-07-12 RX ADMIN — POTASSIUM CHLORIDE 20 MEQ: 750 TABLET, FILM COATED, EXTENDED RELEASE ORAL at 08:41

## 2021-07-12 RX ADMIN — DEXTROSE AND SODIUM CHLORIDE: 5; 450 INJECTION, SOLUTION INTRAVENOUS at 22:54

## 2021-07-12 RX ADMIN — POTASSIUM CHLORIDE 20 MEQ: 750 TABLET, FILM COATED, EXTENDED RELEASE ORAL at 19:51

## 2021-07-12 RX ADMIN — MORPHINE SULFATE 2 MG: 2 INJECTION, SOLUTION INTRAMUSCULAR; INTRAVENOUS at 19:50

## 2021-07-12 RX ADMIN — PIPERACILLIN SODIUM AND TAZOBACTAM SODIUM 3375 MG: 3; .375 INJECTION, POWDER, LYOPHILIZED, FOR SOLUTION INTRAVENOUS at 16:21

## 2021-07-12 RX ADMIN — AMLODIPINE BESYLATE 10 MG: 10 TABLET ORAL at 08:41

## 2021-07-12 RX ADMIN — ONDANSETRON 4 MG: 2 INJECTION INTRAMUSCULAR; INTRAVENOUS at 12:21

## 2021-07-12 ASSESSMENT — PAIN SCALES - GENERAL
PAINLEVEL_OUTOF10: 0
PAINLEVEL_OUTOF10: 3
PAINLEVEL_OUTOF10: 6
PAINLEVEL_OUTOF10: 2
PAINLEVEL_OUTOF10: 6
PAINLEVEL_OUTOF10: 7
PAINLEVEL_OUTOF10: 6
PAINLEVEL_OUTOF10: 7
PAINLEVEL_OUTOF10: 2

## 2021-07-12 ASSESSMENT — PAIN DESCRIPTION - LOCATION
LOCATION: ABDOMEN
LOCATION: ABDOMEN

## 2021-07-12 ASSESSMENT — PAIN DESCRIPTION - ORIENTATION: ORIENTATION: MID;UPPER

## 2021-07-12 ASSESSMENT — PAIN DESCRIPTION - PAIN TYPE: TYPE: ACUTE PAIN

## 2021-07-12 NOTE — PROGRESS NOTES
Cardiology    1. Cleared for surgery  2. Normal LV function with EF 60% with bedside echo  3. No cardiac symptoms  4. Normal EKG    No symptoms concerning for cardiac pathology. Normal LV function.   Cleared for surgery in AM.    Sumanth Castellanos MD

## 2021-07-12 NOTE — PROGRESS NOTES
Pt's dtr calls to SW and states that advance directive documents are filled out and pt is ready to sign for completion. SW met with them and reviewed documents. Assisted pt in completing these with RN as second witness. Copies made and provided to pt with original. Additional copy placed in scan basket for pt's medical record.  Ida Colon MSW LSW 7/12/2021

## 2021-07-12 NOTE — PROGRESS NOTES
Comprehensive Nutrition Assessment    Type and Reason for Visit:  Initial, Positive Nutrition Screen    Nutrition Recommendations/Plan:  Low fat diet    Nutrition Assessment:  Mild malnutrition r/t inadequate nutrient intakes, AEB very low PO intakes for last 2 weeks. Denies any real weight losses despite lack of PO intakes, although usual weight of 178# would represenet a 1.1% loss in last 2 weeks. O/P cristina being planned. States avoids fatty foods in general and did provide with low fat education for homegoing as well. Hopes to advance in diet today with improving Lipase. Malnutrition Assessment:  Malnutrition Status:  Mild malnutrition    Context:  Acute Illness     Findings of the 6 clinical characteristics of malnutrition:  Energy Intake:  7 - 50% or less of estimated energy requirements for 5 or more days  Weight Loss:  No significant weight loss     Body Fat Loss:  No significant body fat loss     Muscle Mass Loss:  No significant muscle mass loss    Fluid Accumulation:  No significant fluid accumulation     Strength:  Not Performed    Estimated Daily Nutrient Needs:  Energy (kcal):  0322-6067 (20-25); Weight Used for Energy Requirements:  Current     Protein (g):   (1.3-1.5); Weight Used for Protein Requirements:  Ideal        Fluid (ml/day):  2000; Method Used for Fluid Requirements:  1 ml/kcal      Nutrition Related Findings:  no malnutrition indices      Wounds:  None       Current Nutrition Therapies:    ADULT DIET; Clear Liquid    Anthropometric Measures:  · Height: 5' 10\" (177.8 cm)  · Current Body Weight: 176 lb 1.6 oz (79.9 kg)   · Admission Body Weight: 175 lb (79.4 kg)    · Usual Body Weight: 178 lb (80.7 kg)     · Ideal Body Weight: 150 lbs; % Ideal Body Weight 117.4 %   · BMI: 25.3  · Adjusted Body Weight:  ; No Adjustment   · BMI Categories: Overweight (BMI 25.0-29. 9)       Nutrition Diagnosis:   · Mild malnutrition related to inadequate protein-energy intake as evidenced by NPO or clear liquid status due to medical condition, poor intake prior to admission    Lab Results   Component Value Date     07/12/2021    K 3.6 (L) 07/12/2021     07/12/2021    CO2 24 07/12/2021    BUN 12 07/12/2021    CREATININE 0.65 07/12/2021    GLUCOSE 140 (H) 07/12/2021    CALCIUM 7.7 (L) 07/12/2021    PROT 5.1 (L) 07/12/2021    LABALBU 2.7 (L) 07/12/2021    BILITOT 0.73 07/12/2021    ALKPHOS 76 07/12/2021    AST 45 (H) 07/12/2021    ALT 70 (H) 07/12/2021    LABGLOM >60 07/12/2021    GFRAA >60 07/12/2021     Lab Results   Component Value Date    LIPASE 260 ([facility]) 07/12/2021     No results found for: LABA1C  No results found for: EAG  No results found for: VITD25    Nutrition Interventions:   Food and/or Nutrient Delivery:  Start Oral Diet (with advance to low fat)  Nutrition Education/Counseling:  Education initiated   Coordination of Nutrition Care:  Continue to monitor while inpatient    Goals:  PO >75% meals on advanced diet       Nutrition Monitoring and Evaluation:   Behavioral-Environmental Outcomes:  None Identified   Food/Nutrient Intake Outcomes:  Diet Advancement/Tolerance, Food and Nutrient Intake  Physical Signs/Symptoms Outcomes:  Biochemical Data, Weight     Discharge Planning:    No discharge needs at this time     Electronically signed by Nimo Blum RD, LD on 7/12/21 at 7:53 AM EDT    Contact: [phone]

## 2021-07-12 NOTE — CONSULTS
Noel Suero is an 68 y.o.  female. Allergies: No Known Allergies    Active Problems:    Idiopathic acute pancreatitis  Resolved Problems:    * No resolved hospital problems. *    Blood pressure (!) 186/78, pulse 91, temperature 98.4 °F (36.9 °C), temperature source Oral, resp. rate 18, height 5' 10\" (1.778 m), weight 175 lb (79.4 kg), SpO2 99 %. HPI    Ms Ana Burroughs is a pleasant, highly functioning 59-year-old white femal admitted through [facility] ER with mid and epigastric abdominal pain. ER work-up including CT scan abdomen pelvis shows acute pancreatitis, WBC 18, hemoglobin 14.5, lipase greater than 3000, normal bilirubin 1.2, AST/ALT 89/41, UA with positive nitrites. Lipid panel is unremarkable. Ultrasound of the gallbladder today confirms cholelithiasis with cholecystitis and mild common bile duct dilatation to 9 mm. Incidental finding of right renal cyst.  No previous history of pancreatitis. Patient rarely drinks alcohol and has no history of alcohol abuse. She has had nonspecific abdominal pain with decreased appetite for the last several weeks. Mostly postprandial.  Some nausea, rare vomiting. No diarrhea. No recent sick contacts nor travel. No history of COVID-19. Rapid Covid negative. No cough, fever nor other respiratory symptoms. Status post hysterectomy for bleeding and fibroids with incidental appendectomy at that time. No previous endoscopy. No family history of GI malignancy to her knowledge. She has never smoked. At this time, she is resting comfortably. Pain is still present, but improved. Lipase has decreased to 1100. WBC 12. No new complaints. Review of Systems   Constitutional: Negative for activity change, appetite change, chills, fever and unexpected weight change. HENT: Negative for nosebleeds, sneezing, sore throat and trouble swallowing. Eyes: Negative for visual disturbance. Respiratory: Negative for cough, choking and shortness of breath. Cardiovascular: Negative for chest pain, palpitations and leg swelling. Gastrointestinal: Positive for abdominal distention, abdominal pain and nausea. Negative for blood in stool and vomiting. Genitourinary: Negative for dysuria, flank pain and hematuria. Musculoskeletal: Positive for arthralgias. Negative for back pain, gait problem and myalgias. Allergic/Immunologic: Negative for immunocompromised state. Neurological: Negative for dizziness, seizures, syncope, weakness and headaches. Hematological: Does not bruise/bleed easily. Psychiatric/Behavioral: Negative for confusion and sleep disturbance. Past Medical History:   Diagnosis Date    Hypertension     Thyroid disease        Past Surgical History:   Procedure Laterality Date    APPENDECTOMY      HYSTERECTOMY      JOINT REPLACEMENT         History reviewed. No pertinent family history.     Allergies:  See list    Current Facility-Administered Medications   Medication Dose Route Frequency Provider Last Rate Last Admin    amLODIPine (NORVASC) tablet 10 mg  10 mg Oral Daily Brandon Abreu MD        levothyroxine (SYNTHROID) tablet 125 mcg  125 mcg Oral Daily Brandon Abreu MD        lisinopril (PRINIVIL;ZESTRIL) tablet 10 mg  10 mg Oral Daily Brandon Abreu MD        thyroid (ARMOUR) tablet 30 mg  30 mg Oral Daily Brandon Abreu MD        sodium chloride flush 0.9 % injection 5-40 mL  5-40 mL Intravenous 2 times per day Brandon Abreu MD        sodium chloride flush 0.9 % injection 5-40 mL  5-40 mL Intravenous PRN Brandon Abreu MD        0.9 % sodium chloride infusion  25 mL Intravenous PRN Brandon Abreu MD        enoxaparin (LOVENOX) injection 40 mg  40 mg Subcutaneous Daily Brandon Abreu MD        ondansetron (ZOFRAN-ODT) disintegrating tablet 4 mg  4 mg Oral Q8H PRN Brandon Abreu MD        Or    ondansetron (ZOFRAN) injection 4 mg  4 mg Intravenous Q6H PRN Brandon Abreu MD        polyethylene glycol (GLYCOLAX) packet 17 g  17 g Oral Daily PRN Simon White MD Brady        acetaminophen (TYLENOL) tablet 650 mg  650 mg Oral Q6H PRN Brandon Abreu MD        Or    acetaminophen (TYLENOL) suppository 650 mg  650 mg Rectal Q6H PRN Brandon Abreu MD        0.9 % sodium chloride infusion   Intravenous Continuous Brandon Abreu  mL/hr at 07/10/21 1627 New Bag at 07/10/21 1627    piperacillin-tazobactam (ZOSYN) 3,375 mg in dextrose 5 % 50 mL IVPB extended infusion (mini-bag)  3,375 mg Intravenous Q8H Brandon Abreu MD 12.5 mL/hr at 07/10/21 1627 3,375 mg at 07/10/21 1627    hydrALAZINE (APRESOLINE) injection 10 mg  10 mg Intravenous Q6H PRN Brandon Abreu MD        morphine (PF) injection 2 mg  2 mg Intravenous Q2H PRN Brandon Abreu MD        morphine (PF) injection 1 mg  1 mg Intravenous Q2H PRN Tulio Abreu MD           Social History     Socioeconomic History    Marital status:      Spouse name: None    Number of children: None    Years of education: None    Highest education level: None   Occupational History    None   Tobacco Use    Smoking status: Never Smoker    Smokeless tobacco: Never Used   Substance and Sexual Activity    Alcohol use: None    Drug use: None    Sexual activity: None   Other Topics Concern    None   Social History Narrative    None     Social Determinants of Health     Financial Resource Strain:     Difficulty of Paying Living Expenses:    Food Insecurity:     Worried About Running Out of Food in the Last Year:     Ran Out of Food in the Last Year:    Transportation Needs:     Lack of Transportation (Medical):      Lack of Transportation (Non-Medical):    Physical Activity:     Days of Exercise per Week:     Minutes of Exercise per Session:    Stress:     Feeling of Stress :    Social Connections:     Frequency of Communication with Friends and Family:     Frequency of Social Gatherings with Friends and Family:     Attends Spiritism Services:     Active Member of Clubs or Organizations:     Attends Club or Organization Meetings:     Marital Status:    Intimate Partner Violence:     Fear of Current or Ex-Partner:     Emotionally Abused:     Physically Abused:     Sexually Abused:        Physical Exam  Vitals and nursing note reviewed. Constitutional:       Appearance: She is well-developed. HENT:      Head: Normocephalic and atraumatic. Eyes:      General: No scleral icterus. Conjunctiva/sclera: Conjunctivae normal.      Pupils: Pupils are equal, round, and reactive to light. Neck:      Vascular: No JVD. Trachea: No tracheal deviation. Cardiovascular:      Rate and Rhythm: Normal rate and regular rhythm. Pulmonary:      Effort: Pulmonary effort is normal. No respiratory distress. Chest:      Chest wall: No tenderness. Abdominal:      General: There is distension. Palpations: Abdomen is soft. There is no mass. Tenderness: There is abdominal tenderness. There is guarding. There is no rebound. Musculoskeletal:         General: Normal range of motion. Cervical back: Normal range of motion and neck supple. Lymphadenopathy:      Cervical: No cervical adenopathy. Skin:     General: Skin is warm and dry. Findings: No erythema or rash. Neurological:      Mental Status: She is alert and oriented to person, place, and time. Cranial Nerves: No cranial nerve deficit. Psychiatric:         Behavior: Behavior normal.         Thought Content:  Thought content normal.         Judgment: Judgment normal.          Culture, Urine [[id number]]    Collected: 07/10/21 1700    Updated: 07/10/21 1733    Specimen Source: Urine, clean catch    Microscopic Urinalysis [[id number]] (Abnormal)    Collected: 07/10/21 1700    Updated: 07/10/21 1732     -         WBC, UA 0 TO 2 /HPF    RBC, UA NOT REPORTED /HPF    Casts UA NOT REPORTED /LPF    Crystals, UA NOT REPORTED /HPF    Epithelial Cells UA 5 TO 10 /HPF    Renal Epithelial, UA NOT REPORTED /HPF    Bacteria, UA 3+Abnormal     Mucus, UA NOT REPORTED Trichomonas, UA NOT REPORTED    Amorphous, UA NOT REPORTED    Other Observations UA NOT REPORTED    Yeast, UA NOT REPORTED   Urinalysis [[id number]] (Abnormal)    Collected: 07/10/21 1700    Updated: 07/10/21 1732    Specimen Source: Urine, clean catch     Color, UA AMBERAbnormal     Turbidity UA CLEAR    Glucose, Ur NEGATIVE    Bilirubin Urine NEGATIVE    Ketones, Urine NEGATIVE    Specific Cavalier, UA 1.010    Urine Hgb NEGATIVE    pH, UA 5.0    Protein, UA TRACEAbnormal     Urobilinogen, Urine Normal    Nitrite, Urine POSITIVEAbnormal     Leukocyte Esterase, Urine NEGATIVE    Urinalysis Comments        EKG 12 Lead [[id number]]    Collected: 07/10/21 1227    Updated: 07/10/21 1625     Ventricular Rate 70 BPM    Atrial Rate 70 BPM    P-R Interval 144 ms    QRS Duration 74 ms    Q-T Interval 440 ms    QTc Calculation (Bazett) 475 ms    P Axis 56 degrees    R Axis 25 degrees    T Axis 79 degrees   Narrative:     Normal sinus rhythm   Normal ECG   No previous ECGs available   COVID-19, Rapid [[id number]]    Collected: 07/10/21 1505    Updated: 07/10/21 1557    Specimen Source: Nasopharyngeal Swab     Specimen Description . NASOPHARYNGEAL SWAB    SARS-CoV-2, Rapid Not Detected    Comment:        Rapid NAAT:  The specimen is NEGATIVE for SARS-CoV-2, the novel coronavirus associated with   COVID-19.         The ID NOW COVID-19 assay is designed to detect the virus that causes COVID-19 in patients   with signs and symptoms of infection who are suspected of COVID-19. An individual without symptoms of COVID-19 and who is not shedding SARS-CoV-2 virus would   expect to have a negative (not detected) result in this assay. Negative results should be treated as presumptive and, if inconsistent with clinical signs   and symptoms or necessary for patient management,   should be tested with an alternative molecular assay.  Negative results do not preclude   SARS-CoV-2 infection and   should not be used as the sole basis for patient management decisions.         Fact sheet for Healthcare Providers: [email or web address]   Fact sheet for Patients: [email or web address]           Methodology: Isothermal Nucleic Acid Amplification       CT ABDOMEN PELVIS W IV CONTRAST Additional Contrast? None [[id number]]    Collected: 07/10/21 1329    Updated: 07/10/21 1407    Narrative:     EXAMINATION: CT ABDOMEN PELVIS W IV CONTRAST     HISTORY: Reason for exam:->Abdominal pain     COMPARISON: None. TECHNIQUE: CT examination of the abdomen and pelvis following the   administration of intravenous contrast. Coronal and sagittal reformations were   performed. 75 mL of Isovue-370 IV contrast given. Oral contrast was not   utilized. Dose reduction techniques were achieved by using automated exposure control   and/or adjustment of mA and/or kV according to patient size and/or use of   iterative reconstruction technique. REPORT:     The lung bases are clear except for slight dependent atelectasis left side   greater than right. Visualized heart shows no gross enlargement or pericardial   effusion. There is mild wall thickening of the distal esophagus and GE junction   which may represent mild chronic inflammation. The abdominal and pelvic   vasculature is unremarkable. Spleen shows no gross enlargement or density or enhancement. Stomach shows no   sign of severe wall thickening or acute process. The duodenum shows mild   reactive change normal wall thickening. The adrenal glands are unremarkable. The gallbladder is distended with mild to moderate pericholecystic fluid. No   stones are noted. Slight intrahepatic ductal dilatation is noted. This also   mild dilatation of the common bowel duct of 8.1 mm. There is moderate to prominent fat stranding noted indicating pancreatitis   throughout the pancreas most prominent involving the pancreatic head region.  No   severe pancreatic ductal dilatation or loculated fluid collections are noted. The kidneys show no signs of stones or inflammation or hydronephrosis. There is   a simple right renal cortical cyst 3.6 cm along the lateral aspect. The right   and left ureters are unremarkable. The bladder shows no severe inflammation or   filling defect or stone. The uterus has been removed. The vaginal cuff and adnexal areas are   unremarkable. The inguinal and ischiorectal regions are unremarkable, slight   inguinal hernias are noted without inflammation or protruding bowel loops. The anus and rectum are unremarkable. There is mild to moderate diverticulosis   of the distal colon without diverticulitis. Proximal colon is unremarkable. The   appendix, ileocecal junction, mesentery and small bowel appear to be normal.     There is slight to mild free fluid in the right upper quadrant. No free air or   loculated fluid collections are noted. No masses or lymphadenopathy noted. Subcutaneous tissues are unremarkable. No abdominal wall hernia is noted. Bone   density is unremarkable. No bony lesions or advanced changes are noted. Moderate degenerative changes noted of the lumbar spine and pelvis. Impression:       1. Moderate to prominent pancreatitis focusing mostly in the vicinity of the   pancreatic head. No loculated fluid collections or pancreatic ductal dilatation   is noted. 2. The gallbladder is grossly distended with mild to moderate surrounding   fluid. Slight intrahepatic and mild external hepatic ductal dilatation is noted   without obvious stones. 3. Mild diverticulosis of the sigmoid colon without diverticulitis.     Lipase [[id number]] (Abnormal)    Collected: 07/10/21 1206    Updated: 07/10/21 1306    Specimen Source: Blood     Lipase >3,000High Panic   U/L   Troponin [[id number]]    Collected: 07/10/21 1206    Updated: 07/10/21 1255    Specimen Source: Blood     Troponin, High Sensitivity 11 ng/L    Comment:        High Sensitivity Troponin values cannot be compared with other Troponin methodologies.         Patients with high levels of Biotin oral intake (i.e >5mg/day) may have falsely decreased   Troponin levels. Samples collected within 8 hours of biotin intake may require additional   information for diagnosis. Troponin T NOT REPORTED ng/mL    Troponin Interp NOT REPORTED   TSH without Reflex [[id number]]    Collected: 07/10/21 1206    Updated: 07/10/21 1254    Specimen Source: Blood     TSH 1.09 mIU/L   Comprehensive Metabolic Panel [[id number]] (Abnormal)    Collected: 07/10/21 1206    Updated: 07/10/21 1245    Specimen Source: Blood     Glucose 165High  mg/dL    BUN 12 mg/dL    CREATININE 0.70 mg/dL    Bun/Cre Ratio 17    Calcium 9.4 mg/dL    Sodium 141 mmol/L    Potassium 3.5Low  mmol/L    Chloride 104 mmol/L    CO2 22 mmol/L    Anion Gap 15 mmol/L    Alkaline Phosphatase 99 U/L    ALT 41High  U/L    AST 89High  U/L    Total Bilirubin 1.20 mg/dL    Total Protein 7.1 g/dL    Albumin 4.3 g/dL    Albumin/Globulin Ratio NOT REPORTED    GFR Non- >60 mL/min    GFR African American >60 mL/min    GFR Comment         Comment: Average GFR for 79or more years old:    65 mL/min/1.73sq m   Chronic Kidney Disease:    <60 mL/min/1.73sq m   Kidney failure:    <15 mL/min/1.73sq m               eGFR calculated using average adult body mass.  Additional eGFR calculator available at:         [email or web address]              GFR Staging NOT REPORTED   APTT [[id number]]    Collected: 07/10/21 1206    Updated: 07/10/21 1235    Specimen Source: Blood     PTT 30.4 sec    Comment:        IV Heparin Therapy Range:       62.0-94.0             Protime-INR [[id number]]    Collected: 07/10/21 1206    Updated: 07/10/21 1234    Specimen Source: Blood     Protime 13.5 sec    INR 1.1    Comment:        Non-therapeutic Range:      INR = 0.9-1.2   Therapeutic Range:    Moderate Anticoagulant Intensity:      INR = 2.0-3.0    High Anticoagulant Intensity:      INR = 2.5-3. 5             CBC Auto Differential [[id number]] (Abnormal)    Collected: 07/10/21 1206    Updated: 07/10/21 1225    Specimen Source: Blood     WBC 17. 5High  k/uL    RBC 5.03 m/uL    Hemoglobin 14.5 g/dL    Hematocrit 43.3 %    MCV 86.2 fL    MCH 28.9 pg    MCHC 33.5 g/dL    RDW 13.1 %    Platelets [id number]  k/uL    MPV NOT REPORTED fL    NRBC Automated NOT REPORTED per 100 WBC    Differential Type YES    Seg Neutrophils 81High  %    Lymphocytes 11Low  %    Monocytes 7 %    Eosinophils % 1 %    Basophils 0 %    Immature Granulocytes NOT REPORTED %    Segs Absolute 14. 10High  k/uL    Absolute Lymph # 2.00 k/uL    Absolute Mono # 1. 20High  k/uL    Absolute Eos # 0.10 k/uL    Basophils Absolute 0.10 k/uL    Absolute Immature Granulocyte NOT REPORTED k/uL    WBC Morphology NOT REPORTED    RBC Morphology NOT REPORTED    Platelet Estimate NOT REPORTED     Lipid Panel [[id number]]    Collected: 07/11/21 0615    Updated: 07/11/21 1618    Specimen Source: Blood     Cholesterol 117 mg/dL    Comment:     Cholesterol Guidelines:       <200  Desirable    200-240  Borderline       >240  Undesirable            HDL 45 mg/dL    Comment:     HDL Guidelines:     <40     Undesirable    40-59    Borderline     >59     Desirable            LDL Cholesterol 56 mg/dL    Comment:     LDL Guidelines:      <100    Desirable    100-129   Near to/above Desirable    130-159   Borderline       >159   Undesirable       Direct (measured) LDL and calculated LDL are not interchangeable tests.         Chol/HDL Ratio 2.6    Comment:            Triglycerides 80 mg/dL    Comment:     Triglyceride Guidelines:      <150   Desirable    150-199  Borderline    200-499  High      >499   Very high    Based on AHA Guidelines for fasting triglyceride, October 2012.            VLDL NOT REPORTED mg/dL   Culture, Urine [[id number]]    Collected: 07/10/21 1700    Updated: 07/11/21 1522    Specimen Source: Urine, clean catch    EKG 12 Lead [[id number]]    Collected: 07/10/21 1227    Updated: 07/11/21 0835     Ventricular Rate 70 BPM    Atrial Rate 70 BPM    P-R Interval 144 ms    QRS Duration 74 ms    Q-T Interval 440 ms    QTc Calculation (Bazett) 475 ms    P Axis 56 degrees    R Axis 25 degrees    T Axis 79 degrees   Narrative:     Normal sinus rhythm   Normal ECG   Lipase [[id number]] (Abnormal)    Collected: 07/11/21 0615    Updated: 07/11/21 0734     Lipase 1,117High Panic   U/L   Comprehensive Metabolic Panel w/ Reflex to MG [[id number]] (Abnormal)    Collected: 07/11/21 0615    Updated: 07/11/21 0646    Specimen Source: Blood     Glucose 112High  mg/dL    BUN 15 mg/dL    CREATININE 0.77 mg/dL    Bun/Cre Ratio 19    Calcium 8.2Low  mg/dL    Sodium 145High  mmol/L    Potassium 4.2 mmol/L    Chloride 112High  mmol/L    CO2 24 mmol/L    Anion Gap 9 mmol/L    Alkaline Phosphatase 88 U/L    ALT 120High  U/L    AST 126High  U/L    Total Bilirubin 0.57 mg/dL    Total Protein 5.7Low  g/dL    Albumin 3.2Low  g/dL    Albumin/Globulin Ratio NOT REPORTED    GFR Non- >60 mL/min    GFR African American >60 mL/min    GFR Comment         Comment: Average GFR for 79or more years old:    65 mL/min/1.73sq m   Chronic Kidney Disease:    <60 mL/min/1.73sq m   Kidney failure:    <15 mL/min/1.73sq m               eGFR calculated using average adult body mass. Additional eGFR calculator available at:         [email or web address]              GFR Staging NOT REPORTED   CBC auto differential [[id number]] (Abnormal)    Collected: 07/11/21 0615    Updated: 07/11/21 7906    Specimen Source: Blood     WBC 12. 2High  k/uL    RBC 4.48 m/uL    Hemoglobin 13.1 g/dL    Hematocrit 38.5 %    MCV 85.8 fL    MCH 29.3 pg    MCHC 34.2 g/dL    RDW 13.4 %    Platelets 678 k/uL    MPV NOT REPORTED fL    NRBC Automated NOT REPORTED per 100 WBC    Differential Type YES    Seg Neutrophils 84High  %    Lymphocytes 9Low  %    Monocytes 7 %    Eosinophils % 0 %    Basophils 0 %    Immature Granulocytes NOT REPORTED %    Segs Absolute 10. 10High  k/uL    Absolute Lymph # 1.10 k/uL    Absolute Mono # 0.90 k/uL    Absolute Eos # 0.10 k/uL    Basophils Absolute 0.00 k/uL    Absolute Immature Granulocyte NOT REPORTED k/uL    WBC Morphology NOT REPORTED    RBC Morphology NOT REPORTED    Platelet Estimate NOT REPORTED   US GALLBLADDER RUQ [[id number]]    Collected: 07/11/21 0302    Updated: 07/11/21 0407    Narrative:     EXAM: US GALLBLADDER RUQ     HISTORY: Pancreatitis. Gallbladder distention noted on prior CT.     COMPARISON: Prior CT abdomen 7/10/2021. TECHNIQUE: Ultrasound right upper quadrant abdominal.       FINDINGS: Stones and sludge are present within the gallbladder lumen. Diffuse   gallbladder wall thickening and gallbladder wall edema are present with the   gallbladder wall   Measuring approximately 1.9 cm in thickness. Positive sonographic Wing Ko sign   was elicited. Biliary ductal dilatation is present with the common bile duct   measuring 0.9 cm in diameter. The pancreas is partially obscured secondary to   overlying bowel gas. The right kidney measures 9.2 cm in length. There is a 4.4   cm right renal cyst. Trace ascites is present. Impression:         1. Cholelithiasis. Diffuse gallbladder wall thickening and gallbladder wall   edema with elicited sonographic Jernigan's sign, likely acute cholecystitis. 2. Mild biliary ductal dilatation with the common bile duct measuring 0.9 cm in   diameter. 3. Right renal cyst.            Assessment:     59-year-old white female with acute pancreatitis most likely secondary to cholelithiasis with cholecystitis. Plan:     Ms Michell Jarvis is currently stable. There are no immediate surgical indications at this time.   Discussed at length with patient the nature of her recent abdominal pain over the last several weeks, current acute pancreatitis, cholecystitis with cholelithiasis, etc.  Agree with bowel rest IV fluids, supportive care, etc.  We will proceed with elective cholecystectomy, robotic assist.  If her pancreatic enzymes quickly normalize, will consider adding her to my Wednesday OR schedule in [place] as an outpatient. Risks, benefits, alternatives thoroughly reviewed and accepted by Ms. Erwin Gibbs, including bleeding, infection, bowel/bile duct injury, COVID-19 exposure/infection, etc.  She conveys clear understanding and is in agreement.     Christal Alvarez MD  7/10/2021

## 2021-07-12 NOTE — ACP (ADVANCE CARE PLANNING)
Advance Care Planning     Advance Care Planning Activator (Inpatient)  Conversation Note      Date of ACP Conversation: 7/12/2021     Conversation Conducted with: Patient with Decision Making Capacity    ACP Activator: Ede Cavazos, [address] [facility] Decision Maker:     Current Designated Health Care Decision Maker:     Primary Decision Maker: Millicent Dudley - [phone]    Secondary Decision Maker: Mimi Montiel - [place] - [phone]  Click here to complete Healthcare Decision Makers including section of the Healthcare Decision Maker Relationship (ie \"Primary\")  Today we documented Decision Maker(s) consistent with Legal Next of Kin hierarchy. Care Preferences    Ventilation: \"If you were in your present state of health and suddenly became very ill and were unable to breathe on your own, what would your preference be about the use of a ventilator (breathing machine) if it were available to you? \"      Would the patient desire the use of ventilator (breathing machine)?: yes    \"If your health worsens and it becomes clear that your chance of recovery is unlikely, what would your preference be about the use of a ventilator (breathing machine) if it were available to you? \"     Would the patient desire the use of ventilator (breathing machine)?: No      Resuscitation  \"CPR works best to restart the heart when there is a sudden event, like a heart attack, in someone who is otherwise healthy. Unfortunately, CPR does not typically restart the heart for people who have serious health conditions or who are very sick. \"    \"In the event your heart stopped as a result of an underlying serious health condition, would you want attempts to be made to restart your heart (answer \"yes\" for attempt to resuscitate) or would you prefer a natural death (answer \"no\" for do not attempt to resuscitate)? \" yes       [] Yes   [x] No   Educated Patient / Ronel Cook regarding differences between Advance Directives and portable DNR orders.     Length of ACP Conversation in minutes:  5  Conversation Outcomes:  [x] ACP discussion completed  [] Existing advance directive reviewed with patient; no changes to patient's previously recorded wishes  [] New Advance Directive completed  [] Portable Do Not Rescitate prepared for Provider review and signature  [] POLST/POST/MOLST/MOST prepared for Provider review and signature      Follow-up plan:    [] Schedule follow-up conversation to continue planning  [] Referred individual to Provider for additional questions/concerns   [] Advised patient/agent/surrogate to review completed ACP document and update if needed with changes in condition, patient preferences or care setting    [x] This note routed to one or more involved healthcare providers

## 2021-07-12 NOTE — PROGRESS NOTES
Quality flow rounds held on 7/12/21     Mir Morrell is admitted for  pancreatitis. Length of stay 2. Education:    Needed Education: pancreatitis, meds, follow up,diet      Do you have any questions regarding your plan of care while at the hospital? denies    Planned Disposition:               [x]  Home when able                [] Swing Bed                [] ECF/SNF               [] Other/TBD    Barriers to Discharge:    Can you afford your medications? yes   Do you have transportation to follow up appointments? Drives self   Do you need any new equipment at home? none   Current equipment includes   none    Do you have a living will or durable power of  for healthcare? denies               If yes do we have a copy on file? n/a    Do you or your family have any questions or concerns we haven't already discussed? Denies    Lives alone, has 2 children that live close and assist with needs. Shiloh Franco and writer present for rounding. PCP- Jayde Seth and prefers a morning follow up appt.

## 2021-07-12 NOTE — PLAN OF CARE
Problem: Falls - Risk of:  Goal: Will remain free from falls  Description: Will remain free from falls  Outcome: Met This Shift  Goal: Absence of physical injury  Description: Absence of physical injury  Outcome: Met This Shift     Problem: Skin Integrity:  Goal: Skin integrity will be maintained  Description: Skin integrity will be maintained  Outcome: Met This Shift     Problem: Pain:  Goal: Pain level will decrease  Description: Pain level will decrease  Outcome: Ongoing  Goal: Control of acute pain  Description: Control of acute pain  Outcome: Ongoing     Problem: Coping:  Goal: Ability to verbalize feelings will improve  Description: Ability to verbalize feelings will improve  Outcome: Ongoing  Goal: Level of anxiety will decrease  Description: Level of anxiety will decrease  Outcome: Ongoing     Problem: Sensory:  Goal: General experience of comfort will improve  Description: General experience of comfort will improve  Outcome: Ongoing

## 2021-07-12 NOTE — PROGRESS NOTES
Hospitalist Progress Note  7/12/2021 7:11 AM  Subjective:   Admit Date: 7/10/2021  PCP: Caio Dunn History:     Angie Hui rates her pain is better today, has no nausea or vomiting. She would like to eat something. Had no fevers or chills overnight, reports no chest pain, no shortness of breath. Vitals stable. Diet: Diet NPO Exceptions are: Sips of Clear Liquids  Medications:   Scheduled Meds:   amLODIPine  10 mg Oral Daily    levothyroxine  125 mcg Oral Daily    lisinopril  10 mg Oral Daily    thyroid  30 mg Oral Daily    sodium chloride flush  5-40 mL Intravenous 2 times per day    enoxaparin  40 mg Subcutaneous Daily    piperacillin-tazobactam  3,375 mg Intravenous Q8H     Continuous Infusions:   dextrose 5 % and 0.45 % NaCl 100 mL/hr at 07/12/21 0315    sodium chloride       PRN Medications: sodium chloride flush, sodium chloride, ondansetron **OR** ondansetron, polyethylene glycol, acetaminophen **OR** acetaminophen, hydrALAZINE, morphine, morphine    Objective:   Vitals: BP (!) 150/75   Pulse 98   Temp 98.7 °F (37.1 °C) (Oral)   Resp 18   Ht 5' 10\" (1.778 m)   Wt 176 lb 1.6 oz (79.9 kg)   SpO2 95%   BMI 25.27 kg/m²   BMI: Body mass index is 25.27 kg/m². CBC:   Recent Labs     07/10/21  1206 07/11/21  0615 07/12/21  0535   WBC 17.5* 12.2* 14.9*   HGB 14.5 13.1 11.7*   * 428 328     BMP:    Recent Labs     07/10/21  1206 07/11/21  0615 07/12/21  0535    145* 137   K 3.5* 4.2 3.6*    112* 106   CO2 22 24 24   BUN 12 15 12   CREATININE 0.70 0.77 0.65   GLUCOSE 165* 112* 140*     Hepatic:   Recent Labs     07/10/21  1206 07/11/21  0615 07/12/21  0535   AST 89* 126* 45*   ALT 41* 120* 70*   BILITOT 1.20 0.57 0.73   ALKPHOS 99 88 76     Troponin: No results for input(s): TROPONINI in the last 72 hours. BNP: No results for input(s): BNP in the last 72 hours.   Lipids:   Recent Labs     07/11/21  0615   CHOL 117   HDL 45     INR:   Recent Labs     07/10/21  1206   INR 1.1       Physical Exam:  General Appearance: alert and oriented to person, place and time, in no acute distress  Cardiovascular: normal rate, regular rhythm, normal S1 and S2, no murmurs  Pulmonary/Chest: clear to auscultation bilaterally- no wheezes, rales or rhonchi, normal air movement, no respiratory distress  Abdomen: tender in upper abdomen, no rebound or guarding, abdomen non-distended, normal bowel sounds  Extremities: no edema   Skin: warm and dry, no rash   Neurological: alert, oriented, normal speech, no focal findings or movement disorder noted    Assessment and Plan:     1. Acute pancreatitis - most likely due to gallstones. Continue supportive care with IV fluids, pain management with IV morphine. Lipase level trending down. Abdominal pain improved. Will start on clear diet. 2.  Cholelithiasis and cholecystitis -continue on IV Zosyn, general surgery consulted, appreciate 's help. Patient will need elective cholecystectomy once pancreatitis resolved. 3.  Hypokalemia -replace and recheck  4. UTI -on IV Zosyn, urine culture pending  5. Hypertension -blood pressure stable, continue on amlodipine and lisinopril, prn IV hydralazine  6. Hypothyroidism    CODE STATUS full    Advanced Care Plan   ( )x  I confirmed that the patient's Advanced Care Plan is present, code status documented, or surrogate decision maker is listed in the patient's medical record. ( )  The patient's advanced care plan is not present because:  (select)   ( ) I confirmed today that the patient does not wish or was not able to name a surrogate decision maker or provide an [address] ( ) Hospice care is currently being provided or has been provided this calender year. ( )  I did not confirm today the presence of an [address] or surrogate decision maker documented within the patient's medical record. (Does not satisfy MIPS performance).       Documentation of Current Medications in the Medical Record   (x )  I have utilized all available immediate resources to obtain, update, or review the patient's current medications. If Yes, Stop Here  ( ) The patient is not eligivel for medications reconciliation; the patient is in an emergent medical situation where delaying treatment would jeopardize the patient's health. ( ) I did not confirm, update or review the patient's current list of medications today.   (does not satisfy MIPS performance)        Patient continues to require inpatient admission related to need for IV fluids, IV morphine for pain control, monitoring vitals, labs, clinical condition      Electronically signed by Shereen Bennett MD on 7/12/2021 at 7:11 AM    [facility]

## 2021-07-12 NOTE — PROGRESS NOTES
SW met with pt to complete assessment during quality rounds with RN case manager. Pt is alert and oriented and pleasant throughout conversation. Pt is a 68year old female admitted for pancreatitis. Pt lives alone in her home in [place]. Pt does not use any DME or community services at home. Pt drives herself and is able to get to her appointments without concerns. Pt is a full code and follows with Kristopher Ellison CNP as PCP. Pt does not have advance directives but is interested in these and SW provided these documents and reviewed with her. ACP note completed with pt. Pt reports that her medications are affordable. Pt reports that the plan from her surgeon is to be transferred to [facility] and they are planning surgery for Wednesday. Pt intends to discharge home from there and states that her son lives next door and her daughter is only a few miles away. Pt reports that she has 6 grandchildren that are also always available to assist her. SW will remain available as needed.  Carol Ann Cunningham MSW JAKOBW 7/12/2021

## 2021-07-13 ENCOUNTER — HOSPITAL ENCOUNTER (INPATIENT)
Age: 76
LOS: 2 days | Discharge: HOME | DRG: 417 | End: 2021-07-15
Attending: SURGERY | Admitting: SURGERY
Payer: MEDICARE

## 2021-07-13 VITALS
DIASTOLIC BLOOD PRESSURE: 60 MMHG | HEART RATE: 88 BPM | SYSTOLIC BLOOD PRESSURE: 133 MMHG | BODY MASS INDEX: 25.21 KG/M2 | RESPIRATION RATE: 18 BRPM | HEIGHT: 70 IN | TEMPERATURE: 98.7 F | WEIGHT: 176.1 LBS | OXYGEN SATURATION: 94 %

## 2021-07-13 DIAGNOSIS — Z90.49 S/P LAPAROSCOPIC CHOLECYSTECTOMY: Primary | ICD-10-CM

## 2021-07-13 PROBLEM — I47.1 SVT (SUPRAVENTRICULAR TACHYCARDIA) (HCC): Status: ACTIVE | Noted: 2021-07-13

## 2021-07-13 PROBLEM — K85.10 ACUTE GALLSTONE PANCREATITIS: Status: ACTIVE | Noted: 2021-07-13

## 2021-07-13 PROBLEM — K81.0 ACUTE CHOLECYSTITIS: Status: ACTIVE | Noted: 2021-07-13

## 2021-07-13 LAB
ABSOLUTE EOS #: 0.1 K/UL (ref 0–0.4)
ABSOLUTE IMMATURE GRANULOCYTE: ABNORMAL K/UL (ref 0–0.3)
ABSOLUTE LYMPH #: 1.3 K/UL (ref 1–4.8)
ABSOLUTE MONO #: 1.2 K/UL (ref 0–1)
ALBUMIN SERPL-MCNC: 2.5 G/DL (ref 3.5–5.2)
ALBUMIN/GLOBULIN RATIO: ABNORMAL (ref 1–2.5)
ALP BLD-CCNC: 81 U/L (ref 35–104)
ALT SERPL-CCNC: 50 U/L (ref 5–33)
ANION GAP SERPL CALCULATED.3IONS-SCNC: 7 MMOL/L (ref 9–17)
AST SERPL-CCNC: 28 U/L
BASOPHILS # BLD: 0 % (ref 0–2)
BASOPHILS ABSOLUTE: 0 K/UL (ref 0–0.2)
BILIRUB SERPL-MCNC: 0.56 MG/DL (ref 0.3–1.2)
BILIRUBIN DIRECT: 0.24 MG/DL
BILIRUBIN, INDIRECT: 0.32 MG/DL (ref 0–1)
BUN BLDV-MCNC: 6 MG/DL (ref 8–23)
BUN/CREAT BLD: 10 (ref 9–20)
CALCIUM SERPL-MCNC: 7.9 MG/DL (ref 8.6–10.4)
CHLORIDE BLD-SCNC: 108 MMOL/L (ref 98–107)
CO2: 24 MMOL/L (ref 20–31)
CREAT SERPL-MCNC: 0.62 MG/DL (ref 0.5–0.9)
DIFFERENTIAL TYPE: YES
EOSINOPHILS RELATIVE PERCENT: 1 % (ref 0–5)
GFR AFRICAN AMERICAN: >60 ML/MIN
GFR NON-AFRICAN AMERICAN: >60 ML/MIN
GFR SERPL CREATININE-BSD FRML MDRD: ABNORMAL ML/MIN/{1.73_M2}
GFR SERPL CREATININE-BSD FRML MDRD: ABNORMAL ML/MIN/{1.73_M2}
GLOBULIN: ABNORMAL G/DL (ref 1.5–3.8)
GLUCOSE BLD-MCNC: 129 MG/DL (ref 70–99)
HCT VFR BLD CALC: 32 % (ref 36–46)
HEMOGLOBIN: 11 G/DL (ref 12–16)
IMMATURE GRANULOCYTES: ABNORMAL %
LIPASE: 80 U/L (ref 13–60)
LYMPHOCYTES # BLD: 9 % (ref 15–40)
MAGNESIUM: 1.7 MG/DL (ref 1.6–2.6)
MCH RBC QN AUTO: 29.3 PG (ref 26–34)
MCHC RBC AUTO-ENTMCNC: 34.3 G/DL (ref 31–37)
MCV RBC AUTO: 85.4 FL (ref 80–100)
MONOCYTES # BLD: 9 % (ref 4–8)
NRBC AUTOMATED: ABNORMAL PER 100 WBC
PDW BLD-RTO: 13.1 % (ref 12.1–15.2)
PLATELET # BLD: 300 K/UL (ref 140–450)
PLATELET ESTIMATE: ABNORMAL
PMV BLD AUTO: ABNORMAL FL (ref 6–12)
POTASSIUM SERPL-SCNC: 3.5 MMOL/L (ref 3.7–5.3)
RBC # BLD: 3.75 M/UL (ref 4–5.2)
RBC # BLD: ABNORMAL 10*6/UL
SEG NEUTROPHILS: 81 % (ref 47–75)
SEGMENTED NEUTROPHILS ABSOLUTE COUNT: 11.6 K/UL (ref 2.5–7)
SODIUM BLD-SCNC: 139 MMOL/L (ref 135–144)
TOTAL PROTEIN: 5.1 G/DL (ref 6.4–8.3)
WBC # BLD: 14.2 K/UL (ref 3.5–11)
WBC # BLD: ABNORMAL 10*3/UL

## 2021-07-13 PROCEDURE — 6360000002 HC RX W HCPCS: Performed by: SURGERY

## 2021-07-13 PROCEDURE — 83735 ASSAY OF MAGNESIUM: CPT

## 2021-07-13 PROCEDURE — 6370000000 HC RX 637 (ALT 250 FOR IP): Performed by: INTERNAL MEDICINE

## 2021-07-13 PROCEDURE — 1200000000 HC SEMI PRIVATE

## 2021-07-13 PROCEDURE — 94761 N-INVAS EAR/PLS OXIMETRY MLT: CPT

## 2021-07-13 PROCEDURE — 8E0W8CZ ROBOTIC ASSISTED PROCEDURE OF TRUNK REGION, VIA NATURAL OR ARTIFICIAL OPENING ENDOSCOPIC: ICD-10-PCS | Performed by: SURGERY

## 2021-07-13 PROCEDURE — 2580000003 HC RX 258: Performed by: INTERNAL MEDICINE

## 2021-07-13 PROCEDURE — 99231 SBSQ HOSP IP/OBS SF/LOW 25: CPT | Performed by: INTERNAL MEDICINE

## 2021-07-13 PROCEDURE — 36415 COLL VENOUS BLD VENIPUNCTURE: CPT

## 2021-07-13 PROCEDURE — 0FT44ZZ RESECTION OF GALLBLADDER, PERCUTANEOUS ENDOSCOPIC APPROACH: ICD-10-PCS | Performed by: SURGERY

## 2021-07-13 PROCEDURE — 6360000002 HC RX W HCPCS: Performed by: INTERNAL MEDICINE

## 2021-07-13 PROCEDURE — 2580000003 HC RX 258: Performed by: SURGERY

## 2021-07-13 PROCEDURE — 80048 BASIC METABOLIC PNL TOTAL CA: CPT

## 2021-07-13 PROCEDURE — 83690 ASSAY OF LIPASE: CPT

## 2021-07-13 PROCEDURE — 85025 COMPLETE CBC W/AUTO DIFF WBC: CPT

## 2021-07-13 PROCEDURE — 94640 AIRWAY INHALATION TREATMENT: CPT

## 2021-07-13 PROCEDURE — 80076 HEPATIC FUNCTION PANEL: CPT

## 2021-07-13 RX ORDER — LEVOFLOXACIN 500 MG/1
500 TABLET, FILM COATED ORAL DAILY
Qty: 5 TABLET | Refills: 0 | Status: SHIPPED | OUTPATIENT
Start: 2021-07-13 | End: 2021-07-18

## 2021-07-13 RX ORDER — METOPROLOL SUCCINATE 25 MG/1
25 TABLET, EXTENDED RELEASE ORAL DAILY
Qty: 30 TABLET | Refills: 3 | Status: SHIPPED | OUTPATIENT
Start: 2021-07-13

## 2021-07-13 RX ORDER — SODIUM CHLORIDE 0.9 % (FLUSH) 0.9 %
5-40 SYRINGE (ML) INJECTION PRN
Status: DISCONTINUED | OUTPATIENT
Start: 2021-07-13 | End: 2021-07-15 | Stop reason: HOSPADM

## 2021-07-13 RX ORDER — ONDANSETRON 4 MG/1
4 TABLET, ORALLY DISINTEGRATING ORAL EVERY 8 HOURS PRN
Status: DISCONTINUED | OUTPATIENT
Start: 2021-07-13 | End: 2021-07-15 | Stop reason: HOSPADM

## 2021-07-13 RX ORDER — HYDROMORPHONE HCL 110MG/55ML
0.25 PATIENT CONTROLLED ANALGESIA SYRINGE INTRAVENOUS
Status: DISCONTINUED | OUTPATIENT
Start: 2021-07-13 | End: 2021-07-15 | Stop reason: HOSPADM

## 2021-07-13 RX ORDER — SODIUM CHLORIDE 9 MG/ML
25 INJECTION, SOLUTION INTRAVENOUS PRN
Status: DISCONTINUED | OUTPATIENT
Start: 2021-07-13 | End: 2021-07-15 | Stop reason: HOSPADM

## 2021-07-13 RX ORDER — MAGNESIUM SULFATE 1 G/100ML
1000 INJECTION INTRAVENOUS PRN
Status: DISCONTINUED | OUTPATIENT
Start: 2021-07-13 | End: 2021-07-15 | Stop reason: HOSPADM

## 2021-07-13 RX ORDER — POTASSIUM CHLORIDE 7.45 MG/ML
10 INJECTION INTRAVENOUS PRN
Status: DISCONTINUED | OUTPATIENT
Start: 2021-07-13 | End: 2021-07-15 | Stop reason: HOSPADM

## 2021-07-13 RX ORDER — POTASSIUM CHLORIDE 20 MEQ/1
40 TABLET, EXTENDED RELEASE ORAL DAILY
Qty: 10 TABLET | Refills: 1 | Status: SHIPPED | OUTPATIENT
Start: 2021-07-13 | End: 2021-07-23

## 2021-07-13 RX ORDER — SODIUM CHLORIDE 9 MG/ML
INJECTION, SOLUTION INTRAVENOUS CONTINUOUS
Status: DISCONTINUED | OUTPATIENT
Start: 2021-07-13 | End: 2021-07-15 | Stop reason: HOSPADM

## 2021-07-13 RX ORDER — METOPROLOL SUCCINATE 25 MG/1
25 TABLET, EXTENDED RELEASE ORAL DAILY
Status: DISCONTINUED | OUTPATIENT
Start: 2021-07-13 | End: 2021-07-13 | Stop reason: HOSPADM

## 2021-07-13 RX ORDER — HYDROMORPHONE HCL 110MG/55ML
0.5 PATIENT CONTROLLED ANALGESIA SYRINGE INTRAVENOUS
Status: DISCONTINUED | OUTPATIENT
Start: 2021-07-13 | End: 2021-07-15 | Stop reason: HOSPADM

## 2021-07-13 RX ORDER — POTASSIUM CHLORIDE 750 MG/1
40 TABLET, FILM COATED, EXTENDED RELEASE ORAL ONCE
Status: COMPLETED | OUTPATIENT
Start: 2021-07-13 | End: 2021-07-13

## 2021-07-13 RX ORDER — SODIUM CHLORIDE 0.9 % (FLUSH) 0.9 %
5-40 SYRINGE (ML) INJECTION EVERY 12 HOURS SCHEDULED
Status: DISCONTINUED | OUTPATIENT
Start: 2021-07-13 | End: 2021-07-15 | Stop reason: HOSPADM

## 2021-07-13 RX ORDER — ONDANSETRON 2 MG/ML
4 INJECTION INTRAMUSCULAR; INTRAVENOUS EVERY 6 HOURS PRN
Status: DISCONTINUED | OUTPATIENT
Start: 2021-07-13 | End: 2021-07-15 | Stop reason: HOSPADM

## 2021-07-13 RX ADMIN — METOPROLOL SUCCINATE 25 MG: 25 TABLET, EXTENDED RELEASE ORAL at 09:24

## 2021-07-13 RX ADMIN — AMLODIPINE BESYLATE 10 MG: 10 TABLET ORAL at 09:24

## 2021-07-13 RX ADMIN — POTASSIUM CHLORIDE 20 MEQ: 750 TABLET, FILM COATED, EXTENDED RELEASE ORAL at 09:24

## 2021-07-13 RX ADMIN — MORPHINE SULFATE 2 MG: 2 INJECTION, SOLUTION INTRAMUSCULAR; INTRAVENOUS at 07:49

## 2021-07-13 RX ADMIN — ACETAMINOPHEN 650 MG: 325 TABLET, FILM COATED ORAL at 14:15

## 2021-07-13 RX ADMIN — HYDROMORPHONE HYDROCHLORIDE 0.25 MG: 2 INJECTION, SOLUTION INTRAMUSCULAR; INTRAVENOUS; SUBCUTANEOUS at 23:06

## 2021-07-13 RX ADMIN — ACETAMINOPHEN 650 MG: 325 TABLET, FILM COATED ORAL at 00:09

## 2021-07-13 RX ADMIN — MORPHINE SULFATE 2 MG: 2 INJECTION, SOLUTION INTRAMUSCULAR; INTRAVENOUS at 14:49

## 2021-07-13 RX ADMIN — PIPERACILLIN SODIUM AND TAZOBACTAM SODIUM 3375 MG: 3; .375 INJECTION, POWDER, LYOPHILIZED, FOR SOLUTION INTRAVENOUS at 00:09

## 2021-07-13 RX ADMIN — ONDANSETRON 4 MG: 2 INJECTION INTRAMUSCULAR; INTRAVENOUS at 07:49

## 2021-07-13 RX ADMIN — ONDANSETRON 4 MG: 2 INJECTION INTRAMUSCULAR; INTRAVENOUS at 14:49

## 2021-07-13 RX ADMIN — LEVOTHYROXINE SODIUM 125 MCG: 25 TABLET ORAL at 06:18

## 2021-07-13 RX ADMIN — LISINOPRIL 10 MG: 10 TABLET ORAL at 09:24

## 2021-07-13 RX ADMIN — PIPERACILLIN SODIUM AND TAZOBACTAM SODIUM 3375 MG: 3; .375 INJECTION, POWDER, LYOPHILIZED, FOR SOLUTION INTRAVENOUS at 09:24

## 2021-07-13 RX ADMIN — SODIUM CHLORIDE: 9 INJECTION, SOLUTION INTRAVENOUS at 19:36

## 2021-07-13 RX ADMIN — POTASSIUM CHLORIDE 40 MEQ: 750 TABLET, FILM COATED, EXTENDED RELEASE ORAL at 09:24

## 2021-07-13 ASSESSMENT — PAIN DESCRIPTION - ORIENTATION
ORIENTATION: MID
ORIENTATION: LOWER

## 2021-07-13 ASSESSMENT — PAIN DESCRIPTION - LOCATION
LOCATION: ABDOMEN
LOCATION: ABDOMEN
LOCATION: BACK
LOCATION: ABDOMEN

## 2021-07-13 ASSESSMENT — PAIN SCALES - GENERAL
PAINLEVEL_OUTOF10: 3
PAINLEVEL_OUTOF10: 7
PAINLEVEL_OUTOF10: 6
PAINLEVEL_OUTOF10: 0
PAINLEVEL_OUTOF10: 6
PAINLEVEL_OUTOF10: 2
PAINLEVEL_OUTOF10: 4
PAINLEVEL_OUTOF10: 7
PAINLEVEL_OUTOF10: 0
PAINLEVEL_OUTOF10: 6

## 2021-07-13 ASSESSMENT — PAIN DESCRIPTION - ONSET
ONSET: GRADUAL

## 2021-07-13 ASSESSMENT — PAIN DESCRIPTION - PAIN TYPE
TYPE: ACUTE PAIN

## 2021-07-13 ASSESSMENT — PAIN DESCRIPTION - DESCRIPTORS
DESCRIPTORS: ACHING;DISCOMFORT

## 2021-07-13 ASSESSMENT — PAIN DESCRIPTION - FREQUENCY
FREQUENCY: INTERMITTENT

## 2021-07-13 NOTE — PROGRESS NOTES
Pt. Arrived to unit alert and oriented x4. Pt. Ambulates to bathroom with a steady gait. Pt. Changed to gown and admission completed. Pt. Call light in reach, Pt. Appears comfortable.

## 2021-07-13 NOTE — PLAN OF CARE
Problem: Falls - Risk of:  Goal: Will remain free from falls  Description: Will remain free from falls  Outcome: Ongoing  Goal: Absence of physical injury  Description: Absence of physical injury  Outcome: Ongoing     Problem: Pain:  Goal: Pain level will decrease  Description: Pain level will decrease  Outcome: Ongoing  Goal: Control of acute pain  Description: Control of acute pain  Outcome: Ongoing  Goal: Control of chronic pain  Description: Control of chronic pain  Outcome: Ongoing     Problem: Coping:  Goal: Ability to verbalize feelings will improve  Description: Ability to verbalize feelings will improve  Outcome: Ongoing     Problem: Fluid Volume:  Goal: Will maintain adequate fluid volume  Description: Will maintain adequate fluid volume  Outcome: Ongoing     Problem: Nutritional:  Goal: Ability to achieve adequate nutritional intake will improve  Description: Ability to achieve adequate nutritional intake will improve  Outcome: Ongoing     Problem: Physical Regulation:  Goal: Complications related to the disease process, condition or treatment will be avoided or minimized  Description: Complications related to the disease process, condition or treatment will be avoided or minimized  Outcome: Ongoing     Problem: Respiratory:  Goal: Ability to achieve and maintain a regular respiratory rate will improve  Description: Ability to achieve and maintain a regular respiratory rate will improve  Outcome: Ongoing     Problem: Sensory:  Goal: General experience of comfort will improve  Description: General experience of comfort will improve  Outcome: Ongoing

## 2021-07-13 NOTE — DISCHARGE SUMMARY
Hospitalist Discharge Summary    Patient:  Joanne Gutierrez  YOB: 1945    MRN: [id number]   Acct: [de-identified]    Primary Care Physician: Caio Thomson    Admit date:  7/10/2021    Discharge date:  7/13/2021       Discharge Diagnoses:     1. Acute pancreatitis secondary to cholelithiasis with cholecystitis  2. Cholelithiasis with cholecystitis  3. Hypokalemia  4. Short runs of SVTs, asymptomatic  5. UTI  6. Hypertension  7.   Hypothyroidism    Discharge Medications:       [facility] SUJ:[id number]    Printed on:07/13/21 1946   Medication Information                      amLODIPine (NORVASC) 10 MG tablet  Take 10 mg by mouth daily             ibuprofen (ADVIL;MOTRIN) 200 MG tablet  Take 200 mg by mouth every 6 hours as needed for Pain             levoFLOXacin (LEVAQUIN) 500 MG tablet  Take 1 tablet by mouth daily for 5 days             levothyroxine (SYNTHROID) 125 MCG tablet  Take 125 mcg by mouth Daily             lisinopril (PRINIVIL;ZESTRIL) 10 MG tablet  Take 10 mg by mouth daily             metoprolol succinate (TOPROL XL) 25 MG extended release tablet  Take 1 tablet by mouth daily             potassium chloride (KLOR-CON M) 20 MEQ extended release tablet  Take 2 tablets by mouth daily for 10 days             thyroid (ARMOUR) 30 MG tablet  Take 30 mg by mouth daily                 Diet:  No diet orders on file    Activity: As tolerates    Follow-up: Follow-up with general surgery Jitendra Kay today and tomorrow for cholecystectomy    Consultants: General surgery Cheryl Najjar,                           Cardiology Dr. Jose D Howell        CBC:   Recent Labs     07/13/21  0510   WBC 14.2*   HGB 11.0*        BMP:    Recent Labs     07/13/21  0510      K 3.5*   *   CO2 24   BUN 6*   CREATININE 0.62   GLUCOSE 129*     Calcium:  Recent Labs     07/13/21  0510   CALCIUM 7.9*       Physical Exam:    Vitals:  Patient Vitals for the past 24 hrs: BP Temp Temp src Pulse Resp SpO2   07/13/21 0923 -- -- -- -- -- 94 %   07/13/21 0749 133/60 98.7 °F (37.1 °C) Oral 88 18 92 %   07/13/21 0628 -- -- -- -- -- 94 %   07/13/21 0011 (!) 141/62 98.6 °F (37 °C) Oral 97 16 93 %   07/12/21 2213 -- -- -- -- -- 94 %   07/12/21 1948 (!) 144/59 99.9 °F (37.7 °C) Oral 98 16 93 %       General Appearance: alert and oriented to person, place and time, in no acute distress  Cardiovascular: normal rate, regular rhythm, normal S1 and S2, no murmurs  Pulmonary/Chest: clear to auscultation bilaterally- no wheezes, rales or rhonchi, normal air movement, no respiratory distress  Abdomen: tender in upper abdomen, no rebound or guarding, abdomen non-distended, normal bowel sounds  Extremities: no edema   Skin: warm and dry, no rash   Neurological: alert, oriented, normal speech, no focal findings or movement disorder noted      Hospital Course: The pt is a 68 y.o. woman presents to ER with severe epigastric pain started 2 weeks before. Initially had episodes os abdominal pain, fullness, had poor appetite, constipation. She felt it was secondary to constipation and had been taking laxatives. She noticed that eating would make her pain worse. Pain became more constant and severe and she came to ER for evaluation. In the ER her CMP was normal other than a potassium of 3.5, glucose 165, ALT 41, and AST 89. CBC showed a WBC of 17.5, Hgb 14.5, and Plt ct of 578. TSH was 1.09. Lipase was > 3000. UA showed positive nitrite with 0 to 2 WBC but 5 to 10 epi cells. CT of the abdomen and pelvis showed:   1. Moderate to prominent pancreatitis focusing mostly in the vicinity of the   pancreatic head. No loculated fluid collections or pancreatic ductal dilatation   is noted. 2. The gallbladder is grossly distended with mild to moderate surrounding   fluid. Slight intrahepatic and mild external hepatic ductal dilatation is noted   without obvious stones.      3. Mild diverticulosis of the sigmoid colon without diverticulitis.      After admission an US of the [address] showed:  1. Cholelithiasis. Diffuse gallbladder wall thickening and gallbladder wall   edema with elicited sonographic Jernigan's sign, likely acute cholecystitis. 2. Mild biliary ductal dilatation with the common bile duct measuring 0.9 cm in   diameter. 3. Right renal cyst.      Crystal Swan was admitted and started on IV pain medication ,  IV hydration, bowel rest, IV Zosyn. Dr. Merlin Erm was consulted for recommendations. The patient's lipase level was trending down and her pain also improved. Dr. Merlin Erm recommended lap cristina on 7/14/21. Lat night Patient developed episodes of asymptomatic SVTs with HR 140s-150s. This morning was evaluated by Dr. Onita Meckel and recommended a low dose Metoprolol and Holter monitor on discharge. Bedside echo showed normal LV size and function with EF around 55%. She was cleared for cholecystectomy. Patient will be discharged home. She is to present to [facility] for admission and plan for lap cristina in the morning.         Disposition: home    Condition: Stable    Time Spent: 32 minutes      Electronically signed by Christiana Coleman MD on 7/13/2021 at 7:46 PM  Discharging Hospitalist

## 2021-07-13 NOTE — PROGRESS NOTES
Discharge instructions given to patient, will review again with her daughter when she comes. Pt verbalizes understanding of all instructions. Informed that RN will call [facility] and let them know everything as well. Pt requesting dose of Morphine and Zofran prior to discharge to get her through the ride to [place].

## 2021-07-13 NOTE — PROGRESS NOTES
Pt taken to private car via wheelchair to be discharged to [facility] for surgery tomorrow. Pts daughter to drive her. All personal belongings and home medications sent with patient.  In stable condition at time of discharge

## 2021-07-13 NOTE — CONSULTS
[place] [address]                                  CONSULTATION    PATIENT NAME: Edy Pandey                 :        1945  MED REC NO:   [id number]                              ROOM:       [id number]  ACCOUNT NO:   [de-identified]                           ADMIT DATE: 07/10/2021  PROVIDER:     Lupis Miller    CONSULT DATE:  2021    REASON FOR CONSULT:  Preoperative clearance. HISTORY OF PRESENT ILLNESS:  The patient is a pleasant 77-year-old  female who has never had a cardiac event. She has never seen a  cardiologist.  Never had a myocardial infarction or cardiac  catheterization. She is  and lives alone, although her son lives  next door. She is unlimited in her activity. She denies any chest pain  or chest discomfort. She has had no PND, orthopnea or pedal edema. Denies any palpitations. Energy level has been good and she has been  unlimited in her activity. She developed severe abdominal discomfort on 07/10 with epigastric pain. She had nausea and vomiting and had a pleuritic-type little chest  discomfort and upper abdominal pain. She has a history of pancreatitis,  although she does not drink any alcohol. Her son, Jayna, again lives  next door who brought her to the emergency room and she was found to  have prominent pancreatitis. The gallbladder was also grossly distended  with slight intrahepatic and mild extrahepatic ductal dilatation. Ultrasound of the gallbladder showed cholelithiasis with cholecystitis. She was admitted for pancreatitis, and she is scheduled to have a  cholecystectomy by Dr. Judi Kennedy on 2021 at [facility]. I was asked to  see her for cardiac clearance. Cardiac enzymes had been negative, and her EKG showed normal sinus  rhythm and was normal.    She has had no PND, orthopnea or pedal edema. Denies any palpitations.    No syncope or near syncope. CARDIAC RISK FACTORS:  Hypertension:  Positive. Peripheral Vascular Disease:  Negative. Smoking:  Negative. Diabetes:  Negative. Other Family Members:  Negative (parents were in their [de-identified] and 80s when  they had heart issues). Hyperlipidemia:  Negative. MEDICATIONS PRIOR TO ADMISSION:  She is on Advil p.r.n., Norvasc 10 mg  daily, lisinopril 10 mg daily, Synthroid 125 mcg daily, and _____ 30 mg  daily. PAST MEDICAL AND SURGICAL HISTORY:  1. She has had hypothyroidism, on replacement. 2.  History of hypertension for several years. 3.  She has had hysterectomy years ago. 4.  Appendectomy. 5.  Knee replacement. FAMILY HISTORY:  Her mother and father had heart disease, but it was in  their [de-identified]. SOCIAL HISTORY:  She is 68years old, . Has two children. Her  son, Jayna, has two children, lives next door. Her daughter has four  children, lives in [place]. She does not smoke or drink alcohol. She  is active, _____ without limitations. REVIEW OF SYSTEMS:  Cardiac as above. Other systems reviewed including  constitutional, eyes, ears, nose and throat, cardiovascular,  respiratory, GI, , musculoskeletal, integumentary, neurologic,  endocrine, hematologic and allergic/immunologic, which were all negative  except for what is described above. She has had a decreased appetite  for the last month with nausea and vomiting. PHYSICAL EXAMINATION:  VITAL SIGNS:  Her blood pressure was 140/75 with a heart rate of 90 and  regular. Respirations were 18. O2 saturation was 95%. GENERAL:  She is a pleasant 68-year-old female who is having some  abdominal discomfort. She was oriented to person, place and time. Answered questions appropriately. SKIN:  No unusual skin changes. HEENT:  The pupils are equally round and intact. Mucous membranes were  dry. NECK:  No JVD. Good carotid pulses. No carotid bruits. No  lymphadenopathy or thyromegaly.   CARDIOVASCULAR EXAM:  S1 and S2 were normal.  No S3 or S4. Soft  systolic blowing type murmur. No diastolic murmur. PMI was normal.  No  lift, thrust, or pericardial friction rub. LUNGS:  Quite clear to auscultation and percussion. ABDOMEN:  Soft and nontender. Good bowel sounds. EXTREMITIES:  Good femoral pulses. Good pedal pulses. No pedal edema. Skin was warm and dry. No calf tenderness. Nail beds pink. Good cap  refill. PULSES:  Bilateral symmetrical radial, brachial and carotid pulses. No  carotid bruits. Good femoral and pedal pulses. NEUROLOGIC EXAM:  Within normal limits. PSYCHIATRIC EXAM:  Within normal limits. LABORATORY DATA:  Sodium was 145, potassium 4.2, BUN 15, creatinine  0.77, GFR greater than 60. Her calcium was 8.2. Cholesterol 117, HDL  45, LDL 56, triglycerides 80. ALT was 120 with an AST of 126. White  count 12.2, hemoglobin 13.1 with a platelet count of 522,155. EKG showed normal sinus rhythm, was normal.    I did a bedside echocardiogram, which showed normal LV size and  function, with ejection fraction in the 55% range. She had no obvious  valve abnormalities. IMPRESSION:  1. Cleared for cholecystectomy. 2.  Hypertension. 3.  No history of chest pain, shortness of breath or loss of energy to  indicate any anginal-type symptoms or cardiac symptoms. 4.  Normal LV function by bedside echocardiogram with an EF in the 55%  to 60% range with no obvious valve abnormalities. 5.  Pancreatitis. 6.  Cholelithiasis, pending surgery on 07/13. DISCUSSION:  The patient has no cardiac history. She also had no  symptoms to indicate any cardiac pathology at this time. Her EKG is  normal.  She is unlimited in her activity without any chest pain,  shortness of breath, etc.  She is cleared for gallbladder surgery from  my standpoint with no further testing. Thank you very much for allowing me the privilege of seeing the patient.   If you have any questions on my thoughts, please do not hesitate to  contact me. Lindsey Cintron    D: 07/13/2021 4:24:10       T: 07/13/2021 6:12:31     [id number]  Job#: [id number]     Doc#: [id number]    CC:   Leny Guo

## 2021-07-13 NOTE — H&P
back pain, gait problem and myalgias. Allergic/Immunologic: Negative for immunocompromised state. Neurological: Negative for dizziness, seizures, syncope, weakness and headaches. Hematological: Does not bruise/bleed easily. Psychiatric/Behavioral: Negative for confusion and sleep disturbance.              Past Medical History:   Diagnosis Date    Hypertension      Thyroid disease                 Past Surgical History:   Procedure Laterality Date    APPENDECTOMY        HYSTERECTOMY        JOINT REPLACEMENT             History reviewed.  No pertinent family history.     Allergies:  See list               Current Facility-Administered Medications   Medication Dose Route Frequency Provider Last Rate Last Admin    amLODIPine (NORVASC) tablet 10 mg  10 mg Oral Daily Brandon Abreu MD        levothyroxine (SYNTHROID) tablet 125 mcg  125 mcg Oral Daily Brandon Abreu MD        lisinopril (PRINIVIL;ZESTRIL) tablet 10 mg  10 mg Oral Daily Brandon Abreu MD        thyroid (ARMOUR) tablet 30 mg  30 mg Oral Daily Brandon Abreu MD        sodium chloride flush 0.9 % injection 5-40 mL  5-40 mL Intravenous 2 times per day Brandon Abreu MD        sodium chloride flush 0.9 % injection 5-40 mL  5-40 mL Intravenous PRN Brandon Abreu MD        0.9 % sodium chloride infusion  25 mL Intravenous PRN Bradnon Abreu MD        enoxaparin (LOVENOX) injection 40 mg  40 mg Subcutaneous Daily Brandon Abreu MD        ondansetron (ZOFRAN-ODT) disintegrating tablet 4 mg  4 mg Oral Q8H PRN Brandon Abreu MD         Or    ondansetron (ZOFRAN) injection 4 mg  4 mg Intravenous Q6H PRN Brandon Abreu MD        polyethylene glycol (GLYCOLAX) packet 17 g  17 g Oral Daily PRN Brandon Abreu MD        acetaminophen (TYLENOL) tablet 650 mg  650 mg Oral Q6H PRN Brandno Abreu MD         Or    acetaminophen (TYLENOL) suppository 650 mg  650 mg Rectal Q6H PRN Brandon Abreu MD        0.9 % sodium chloride infusion   Intravenous Continuous Brandon Abreu  mL/hr at 07/10/21 1627 New Bag at 07/10/21 1627    piperacillin-tazobactam (ZOSYN) 3,375 mg in dextrose 5 % 50 mL IVPB extended infusion (mini-bag)  3,375 mg Intravenous Q8H Brandon Abreu MD 12.5 mL/hr at 07/10/21 1627 3,375 mg at 07/10/21 1627    hydrALAZINE (APRESOLINE) injection 10 mg  10 mg Intravenous Q6H PRN Brandon Abreu MD        morphine (PF) injection 2 mg  2 mg Intravenous Q2H PRN Brandon Abreu MD        morphine (PF) injection 1 mg  1 mg Intravenous Q2H PRN Brandon Abreu MD             Social History            Socioeconomic History    Marital status:        Spouse name: None    Number of children: None    Years of education: None    Highest education level: None   Occupational History    None   Tobacco Use    Smoking status: Never Smoker    Smokeless tobacco: Never Used   Substance and Sexual Activity    Alcohol use: None    Drug use: None    Sexual activity: None   Other Topics Concern    None   Social History Narrative    None      Social Determinants of Health          Financial Resource Strain:     Difficulty of Paying Living Expenses:    Food Insecurity:     Worried About Running Out of Food in the Last Year:     Ran Out of Food in the Last Year:    Transportation Needs:     Lack of Transportation (Medical):  Lack of Transportation (Non-Medical):    Physical Activity:     Days of Exercise per Week:     Minutes of Exercise per Session:    Stress:     Feeling of Stress :    Social Connections:     Frequency of Communication with Friends and Family:     Frequency of Social Gatherings with Friends and Family:     Attends Judaism Services:     Active Member of Clubs or Organizations:     Attends Club or Organization Meetings:     Marital Status:    Intimate Partner Violence:     Fear of Current or Ex-Partner:     Emotionally Abused:     Physically Abused:     Sexually Abused:          Physical Exam  Vitals and nursing note reviewed.    Constitutional:       Appearance: She is well-developed. HENT:      Head: Normocephalic and atraumatic. Eyes:      General: No scleral icterus. Conjunctiva/sclera: Conjunctivae normal.      Pupils: Pupils are equal, round, and reactive to light. Neck:      Vascular: No JVD. Trachea: No tracheal deviation. Cardiovascular:      Rate and Rhythm: Normal rate and regular rhythm. Pulmonary:      Effort: Pulmonary effort is normal. No respiratory distress. Chest:      Chest wall: No tenderness. Abdominal:      General: There is distension. Palpations: Abdomen is soft. There is no mass. Tenderness: There is abdominal tenderness. There is guarding. There is no rebound. Musculoskeletal:         General: Normal range of motion. Cervical back: Normal range of motion and neck supple. Lymphadenopathy:      Cervical: No cervical adenopathy. Skin:     General: Skin is warm and dry. Findings: No erythema or rash. Neurological:      Mental Status: She is alert and oriented to person, place, and time. Cranial Nerves: No cranial nerve deficit. Psychiatric:         Behavior: Behavior normal.         Thought Content:  Thought content normal.         Judgment: Judgment normal.                   Culture, Urine [[id number]]      Collected: 07/10/21 1700      Updated: 07/10/21 1733      Specimen Source: Urine, clean catch      Microscopic Urinalysis [[id number]] (Abnormal)      Collected: 07/10/21 1700      Updated: 07/10/21 1732        -           WBC, UA 0 TO 2 /HPF      RBC, UA NOT REPORTED /HPF      Casts UA NOT REPORTED /LPF      Crystals, UA NOT REPORTED /HPF      Epithelial Cells UA 5 TO 10 /HPF      Renal Epithelial, UA NOT REPORTED /HPF      Bacteria, UA 3+Abnormal       Mucus, UA NOT REPORTED      Trichomonas, UA NOT REPORTED      Amorphous, UA NOT REPORTED      Other Observations UA NOT REPORTED      Yeast, UA NOT REPORTED    Urinalysis [[id number]] (Abnormal)      Collected: 07/10/21 1700      Updated: 07/10/21 1732      Specimen Source: Urine, clean catch        Color, UA AMBERAbnormal       Turbidity UA CLEAR      Glucose, Ur NEGATIVE      Bilirubin Urine NEGATIVE      Ketones, Urine NEGATIVE      Specific Tyler, UA 1.010      Urine Hgb NEGATIVE      pH, UA 5.0      Protein, UA TRACEAbnormal       Urobilinogen, Urine Normal      Nitrite, Urine POSITIVEAbnormal       Leukocyte Esterase, Urine NEGATIVE      Urinalysis Comments         EKG 12 Lead [[id number]]      Collected: 07/10/21 1227      Updated: 07/10/21 1625        Ventricular Rate 70 BPM      Atrial Rate 70 BPM      P-R Interval 144 ms      QRS Duration 74 ms      Q-T Interval 440 ms      QTc Calculation (Bazett) 475 ms      P Axis 56 degrees      R Axis 25 degrees      T Axis 79 degrees    Narrative:      Normal sinus rhythm   Normal ECG   No previous ECGs available    COVID-19, Rapid [[id number]]      Collected: 07/10/21 1505      Updated: 07/10/21 1557      Specimen Source: Nasopharyngeal Swab        Specimen Description . NASOPHARYNGEAL SWAB      SARS-CoV-2, Rapid Not Detected      Comment:        Rapid NAAT:  The specimen is NEGATIVE for SARS-CoV-2, the novel coronavirus associated with   COVID-19.         The ID NOW COVID-19 assay is designed to detect the virus that causes COVID-19 in patients   with signs and symptoms of infection who are suspected of COVID-19. An individual without symptoms of COVID-19 and who is not shedding SARS-CoV-2 virus would   expect to have a negative (not detected) result in this assay. Negative results should be treated as presumptive and, if inconsistent with clinical signs   and symptoms or necessary for patient management,   should be tested with an alternative molecular assay.  Negative results do not preclude   SARS-CoV-2 infection and   should not be used as the sole basis for patient management decisions.         Fact sheet for Healthcare Providers: Zaira.es   Fact sheet for Patients: Endy           Methodology: Isothermal Nucleic Acid Amplification       CT ABDOMEN PELVIS W IV CONTRAST Additional Contrast? None [[id number]]      Collected: 07/10/21 1329      Updated: 07/10/21 1407      Narrative:      EXAMINATION: CT ABDOMEN PELVIS W IV CONTRAST     HISTORY: Reason for exam:->Abdominal pain     COMPARISON: None. TECHNIQUE: CT examination of the abdomen and pelvis following the   administration of intravenous contrast. Coronal and sagittal reformations were   performed. 75 mL of Isovue-370 IV contrast given. Oral contrast was not   utilized. Dose reduction techniques were achieved by using automated exposure control   and/or adjustment of mA and/or kV according to patient size and/or use of   iterative reconstruction technique. REPORT:     The lung bases are clear except for slight dependent atelectasis left side   greater than right. Visualized heart shows no gross enlargement or pericardial   effusion. There is mild wall thickening of the distal esophagus and GE junction   which may represent mild chronic inflammation. The abdominal and pelvic   vasculature is unremarkable. Spleen shows no gross enlargement or density or enhancement. Stomach shows no   sign of severe wall thickening or acute process. The duodenum shows mild   reactive change normal wall thickening. The adrenal glands are unremarkable. The gallbladder is distended with mild to moderate pericholecystic fluid. No   stones are noted. Slight intrahepatic ductal dilatation is noted. This also   mild dilatation of the common bowel duct of 8.1 mm. There is moderate to prominent fat stranding noted indicating pancreatitis   throughout the pancreas most prominent involving the pancreatic head region. No   severe pancreatic ductal dilatation or loculated fluid collections are noted. The kidneys show no signs of stones or inflammation or hydronephrosis.  There is   a simple right renal cortical cyst 3.6 cm along the lateral aspect. The right   and left ureters are unremarkable. The bladder shows no severe inflammation or   filling defect or stone. The uterus has been removed. The vaginal cuff and adnexal areas are   unremarkable. The inguinal and ischiorectal regions are unremarkable, slight   inguinal hernias are noted without inflammation or protruding bowel loops. The anus and rectum are unremarkable. There is mild to moderate diverticulosis   of the distal colon without diverticulitis. Proximal colon is unremarkable. The   appendix, ileocecal junction, mesentery and small bowel appear to be normal.     There is slight to mild free fluid in the right upper quadrant. No free air or   loculated fluid collections are noted. No masses or lymphadenopathy noted. Subcutaneous tissues are unremarkable. No abdominal wall hernia is noted. Bone   density is unremarkable. No bony lesions or advanced changes are noted. Moderate degenerative changes noted of the lumbar spine and pelvis.        Impression:        1. Moderate to prominent pancreatitis focusing mostly in the vicinity of the   pancreatic head. No loculated fluid collections or pancreatic ductal dilatation   is noted. 2. The gallbladder is grossly distended with mild to moderate surrounding   fluid. Slight intrahepatic and mild external hepatic ductal dilatation is noted   without obvious stones. 3. Mild diverticulosis of the sigmoid colon without diverticulitis.      Lipase [[id number]] (Abnormal)      Collected: 07/10/21 1206      Updated: 07/10/21 1306      Specimen Source: Blood        Lipase >3,000High Panic   U/L    Troponin [[id number]]      Collected: 07/10/21 1206      Updated: 07/10/21 1255      Specimen Source: Blood        Troponin, High Sensitivity 11 ng/L      Comment:        High Sensitivity Troponin values cannot be compared with other Troponin methodologies.         Patients with high levels of Biotin oral intake (i.e >5mg/day) may have falsely decreased   Troponin levels. Samples collected within 8 hours of biotin intake may require additional   information for diagnosis.         Troponin T NOT REPORTED ng/mL      Troponin Interp NOT REPORTED    TSH without Reflex [[id number]]      Collected: 07/10/21 1206      Updated: 07/10/21 1254      Specimen Source: Blood        TSH 1.09 mIU/L    Comprehensive Metabolic Panel [[id number]] (Abnormal)      Collected: 07/10/21 1206      Updated: 07/10/21 1245      Specimen Source: Blood        Glucose 165High  mg/dL      BUN 12 mg/dL      CREATININE 0.70 mg/dL      Bun/Cre Ratio 17      Calcium 9.4 mg/dL      Sodium 141 mmol/L      Potassium 3.5Low  mmol/L      Chloride 104 mmol/L      CO2 22 mmol/L      Anion Gap 15 mmol/L      Alkaline Phosphatase 99 U/L      ALT 41High  U/L      AST 89High  U/L      Total Bilirubin 1.20 mg/dL      Total Protein 7.1 g/dL      Albumin 4.3 g/dL      Albumin/Globulin Ratio NOT REPORTED      GFR Non-African American >60 mL/min      GFR African American >60 mL/min      GFR Comment           Comment: Average GFR for 79or more years old:    65 mL/min/1.73sq m   Chronic Kidney Disease:    <60 mL/min/1.73sq m   Kidney failure:    <15 mL/min/1.73sq m               eGFR calculated using average adult body mass.  Additional eGFR calculator available at:         [employer]               GFR Staging NOT REPORTED    APTT [[id number]]      Collected: 07/10/21 1206      Updated: 07/10/21 1235      Specimen Source: Blood        PTT 30.4 sec      Comment:        IV Heparin Therapy Range:       62.0-94.0             Protime-INR [[id number]]      Collected: 07/10/21 1206      Updated: 07/10/21 1234      Specimen Source: Blood        Protime 13.5 sec      INR 1.1      Comment:        Non-therapeutic Range:      INR = 0.9-1.2   Therapeutic Range:    Moderate Anticoagulant Intensity:      INR = 2.0-3.0   [employer] Anticoagulant Intensity:      INR = 2.5-3. 5             CBC Auto Differential [[id number]] (Abnormal)      Collected: 07/10/21 1206      Updated: 07/10/21 1225      Specimen Source: Blood        WBC 17. 5High  k/uL      RBC 5.03 m/uL      Hemoglobin 14.5 g/dL      Hematocrit 43.3 %      MCV 86.2 fL      MCH 28.9 pg      MCHC 33.5 g/dL      RDW 13.1 %      Platelets [id number]  k/uL      MPV NOT REPORTED fL      NRBC Automated NOT REPORTED per 100 WBC      Differential Type YES      Seg Neutrophils 81High  %      Lymphocytes 11Low  %      Monocytes 7 %      Eosinophils % 1 %      Basophils 0 %      Immature Granulocytes NOT REPORTED %      Segs Absolute 14. 10High  k/uL      Absolute Lymph # 2.00 k/uL      Absolute Mono # 1. 20High  k/uL      Absolute Eos # 0.10 k/uL      Basophils Absolute 0.10 k/uL      Absolute Immature Granulocyte NOT REPORTED k/uL      WBC Morphology NOT REPORTED      RBC Morphology NOT REPORTED      Platelet Estimate NOT REPORTED              Lipid Panel [[id number]]      Collected: 07/11/21 0615      Updated: 07/11/21 1618      Specimen Source: Blood        Cholesterol 117 mg/dL      Comment:     Cholesterol Guidelines:       <200  Desirable    200-240  Borderline       >240  Undesirable             HDL 45 mg/dL      Comment:     HDL Guidelines:     <40     Undesirable    40-59    Borderline     >59     Desirable             LDL Cholesterol 56 mg/dL      Comment:     LDL Guidelines:      <100    Desirable    100-129   Near to/above Desirable    130-159   Borderline       >159   Undesirable       Direct (measured) LDL and calculated LDL are not interchangeable tests.         Chol/HDL Ratio 2.6      Comment:             Triglycerides 80 mg/dL      Comment:     Triglyceride Guidelines:      <150   Desirable    150-199  Borderline    200-499  High      >499   Very high    Based on AHA Guidelines for fasting triglyceride, October 2012.             VLDL NOT REPORTED mg/dL    Culture, Urine [[id number]]   Collected: 07/10/21 1700      Updated: 07/11/21 1522      Specimen Source: Urine, clean catch      EKG 12 Lead [[id number]]      Collected: 07/10/21 1227      Updated: 07/11/21 0835        Ventricular Rate 70 BPM      Atrial Rate 70 BPM      P-R Interval 144 ms      QRS Duration 74 ms      Q-T Interval 440 ms      QTc Calculation (Bazett) 475 ms      P Axis 56 degrees      R Axis 25 degrees      T Axis 79 degrees    Narrative:      Normal sinus rhythm   Normal ECG    Lipase [[id number]] (Abnormal)      Collected: 07/11/21 0615      Updated: 07/11/21 0734        Lipase 1,117High Panic   U/L    Comprehensive Metabolic Panel w/ Reflex to MG [[id number]] (Abnormal)      Collected: 07/11/21 0615      Updated: 07/11/21 0646      Specimen Source: Blood        Glucose 112High  mg/dL      BUN 15 mg/dL      CREATININE 0.77 mg/dL      Bun/Cre Ratio 19      Calcium 8.2Low  mg/dL      Sodium 145High  mmol/L      Potassium 4.2 mmol/L      Chloride 112High  mmol/L      CO2 24 mmol/L      Anion Gap 9 mmol/L      Alkaline Phosphatase 88 U/L      ALT 120High  U/L      AST 126High  U/L      Total Bilirubin 0.57 mg/dL      Total Protein 5.7Low  g/dL      Albumin 3.2Low  g/dL      Albumin/Globulin Ratio NOT REPORTED      GFR Non-African American >60 mL/min      GFR African American >60 mL/min      GFR Comment           Comment: Average GFR for 79or more years old:    65 mL/min/1.73sq m   Chronic Kidney Disease:    <60 mL/min/1.73sq m   Kidney failure:    <15 mL/min/1.73sq m               eGFR calculated using average adult body mass. Additional eGFR calculator available at:         [employer]               GFR Staging NOT REPORTED    CBC auto differential [[id number]] (Abnormal)      Collected: 07/11/21 0615      Updated: 07/11/21 9868      Specimen Source: Blood        WBC 12. 2High  k/uL      RBC 4.48 m/uL      Hemoglobin 13.1 g/dL      Hematocrit 38.5 %      MCV 85.8 fL      MCH 29.3 pg      MCHC 34.2 g/dL      RDW 13.4 %      Platelets 015 k/uL      MPV NOT REPORTED fL      NRBC Automated NOT REPORTED per 100 WBC      Differential Type YES      Seg Neutrophils 84High  %      Lymphocytes 9Low  %      Monocytes 7 %      Eosinophils % 0 %      Basophils 0 %      Immature Granulocytes NOT REPORTED %      Segs Absolute 10. 10High  k/uL      Absolute Lymph # 1.10 k/uL      Absolute Mono # 0.90 k/uL      Absolute Eos # 0.10 k/uL      Basophils Absolute 0.00 k/uL      Absolute Immature Granulocyte NOT REPORTED k/uL      WBC Morphology NOT REPORTED      RBC Morphology NOT REPORTED      Platelet Estimate NOT REPORTED    US GALLBLADDER RUQ [[id number]]      Collected: 07/11/21 0302      Updated: 07/11/21 0407      Narrative:      EXAM: US GALLBLADDER RUQ     HISTORY: Pancreatitis. Gallbladder distention noted on prior CT.     COMPARISON: Prior CT abdomen 7/10/2021. TECHNIQUE: Ultrasound right upper quadrant abdominal.       FINDINGS: Stones and sludge are present within the gallbladder lumen. Diffuse   gallbladder wall thickening and gallbladder wall edema are present with the   gallbladder wall   Measuring approximately 1.9 cm in thickness. Positive sonographic Conda Herter sign   was elicited. Biliary ductal dilatation is present with the common bile duct   measuring 0.9 cm in diameter. The pancreas is partially obscured secondary to   overlying bowel gas. The right kidney measures 9.2 cm in length. There is a 4.4   cm right renal cyst. Trace ascites is present.        Impression:          1. Cholelithiasis. Diffuse gallbladder wall thickening and gallbladder wall   edema with elicited sonographic Jernigan's sign, likely acute cholecystitis. 2. Mild biliary ductal dilatation with the common bile duct measuring 0.9 cm in   diameter.    3. Right renal cyst.                 Assessment:                 12-year-old white female with acute pancreatitis most likely secondary to cholelithiasis with cholecystitis.     Plan:                 Ms Erwin Gibbs is currently stable. Laparoscopic cholecystectomy, robotic assist plan for tomorrow morning.  Discussed at length with patient the nature of her recent abdominal pain over the last several weeks, current acute pancreatitis, cholecystitis with cholelithiasis, etc.  Risks, benefits, alternatives thoroughly reviewed and accepted by Ms. Erwin Gibbs, including bleeding, infection, bowel/bile duct injury, COVID-19 exposure/infection, etc.  She conveys clear understanding and is in agreement.     Christal Alvarez MD  7/10/2021

## 2021-07-13 NOTE — PROGRESS NOTES
Cardiology    She had episode of SVT at 140-150 bpm, totally asymptomatic. No bp changes. She states she \"perhaps\" had some minor palpitations previously. No history of syncope, near syncope or lightheadedness. Her underlying HR is sinus in 80's. I was going to add a Calcium blocker, however, with her slightly increased sinus rate, I believe a low dose of Metoprolol would be a better choice. She can still be discharged today and have surgery in AM.  However, would place a holter monitor on discharge to monitor rhythm.     Thanks, Yared Billings MD

## 2021-07-13 NOTE — PROGRESS NOTES
[place] per Dr. Jose D Howell for pt to be set up with 24 hour Holter monitor in [place] after surgery tomorrow.

## 2021-07-13 NOTE — PROGRESS NOTES
RN Supervisor called and notified pt was discharged and on her way to their facility. Report given to IQRA Malone at [facility].

## 2021-07-13 NOTE — PLAN OF CARE
Problem: Falls - Risk of:  Goal: Will remain free from falls  Description: Will remain free from falls  7/13/2021 0921 by Manuel Velasco RN  Outcome: Ongoing     Problem: Pain:  Goal: Pain level will decrease  Description: Pain level will decrease  7/13/2021 0921 by Manuel Velasco RN  Outcome: Ongoing     Problem: Pain:  Goal: Control of acute pain  Description: Control of acute pain  7/13/2021 0921 by Manuel Velasco RN  Outcome: Ongoing     Problem: Skin Integrity:  Goal: Skin integrity will be maintained  Description: Skin integrity will be maintained  Outcome: Ongoing

## 2021-07-13 NOTE — PLAN OF CARE
Problem: Pain:  Goal: Pain level will decrease  Description: Pain level will decrease  Outcome: Ongoing  Goal: Control of acute pain  Description: Control of acute pain  Outcome: Ongoing  Note: Pt's pain is assessed using 0-10 scale. Pt has PRN pain medications available. Patient has been educated on use and possible side effects of pain medications. Patient understands to ask for pain medications before pain becomes too unbearable but has also been educated and nonpharmacological options such as deep breathing, distraction, and repositioning. Goal: Control of chronic pain  Description: Control of chronic pain  Outcome: Ongoing     Problem: Falls - Risk of:  Goal: Will remain free from falls  Description: Will remain free from falls  Outcome: Ongoing  Note: Pt A&Ox4. Pt uses call light appropriately and call light and bedside table remain in reach. Rails up x2 bed in lowest position. Bed alarm is on. Pt wears nonskid socks when standing or ambulating. Fall sign in place. Room remains free of clutter. Goal: Absence of physical injury  Description: Absence of physical injury  Outcome: Ongoing     Problem: HEMODYNAMIC STATUS  Goal: Patient has stable vital signs and fluid balance  Outcome: Ongoing  Note: Vitals and pulses are checked twice per shift and PRN. IV fluids infusing. Labs being drawn daily. Problem: OXYGENATION/RESPIRATORY FUNCTION  Goal: Patient will achieve/maintain normal respiratory rate/effort  Outcome: Ongoing     Problem: MOBILITY  Goal: Early mobilization is achieved  Outcome: Ongoing     Problem: ELIMINATION  Goal: Elimination patterns are normal or improving  Description: Elimination patterns return to pre-surgery normal patterns  Outcome: Ongoing     Problem: SKIN INTEGRITY  Goal: Skin integrity is maintained or improved  Outcome: Ongoing  Note: Patient is educated on the importance of turning and repositioning often. Skin remains warm dry and intact with no skin issues noted. Zackery scale is assessed each shift. Will continue to monitor.

## 2021-07-14 ENCOUNTER — ANESTHESIA EVENT (OUTPATIENT)
Dept: OPERATING ROOM | Age: 76
DRG: 417 | End: 2021-07-14
Payer: MEDICARE

## 2021-07-14 ENCOUNTER — ANESTHESIA (OUTPATIENT)
Dept: OPERATING ROOM | Age: 76
DRG: 417 | End: 2021-07-14
Payer: MEDICARE

## 2021-07-14 VITALS — SYSTOLIC BLOOD PRESSURE: 147 MMHG | DIASTOLIC BLOOD PRESSURE: 61 MMHG | OXYGEN SATURATION: 98 %

## 2021-07-14 LAB
ALBUMIN SERPL-MCNC: 2.9 G/DL (ref 3.5–5.2)
ALBUMIN/GLOBULIN RATIO: 1.1 (ref 1–2.5)
ALP BLD-CCNC: 95 U/L (ref 35–104)
ALT SERPL-CCNC: 39 U/L (ref 5–33)
ANION GAP SERPL CALCULATED.3IONS-SCNC: 11 MMOL/L (ref 9–17)
AST SERPL-CCNC: 18 U/L
BILIRUB SERPL-MCNC: 0.47 MG/DL (ref 0.3–1.2)
BUN BLDV-MCNC: 6 MG/DL (ref 8–23)
BUN/CREAT BLD: 12 (ref 9–20)
CALCIUM SERPL-MCNC: 8.2 MG/DL (ref 8.6–10.4)
CHLORIDE BLD-SCNC: 103 MMOL/L (ref 98–107)
CO2: 23 MMOL/L (ref 20–31)
CREAT SERPL-MCNC: 0.5 MG/DL (ref 0.5–0.9)
CULTURE: ABNORMAL
CULTURE: ABNORMAL
GFR AFRICAN AMERICAN: >60 ML/MIN
GFR NON-AFRICAN AMERICAN: >60 ML/MIN
GFR SERPL CREATININE-BSD FRML MDRD: ABNORMAL ML/MIN/{1.73_M2}
GFR SERPL CREATININE-BSD FRML MDRD: ABNORMAL ML/MIN/{1.73_M2}
GLUCOSE BLD-MCNC: 92 MG/DL (ref 70–99)
HCT VFR BLD CALC: 34.8 % (ref 36.3–47.1)
HEMOGLOBIN: 11.3 G/DL (ref 11.9–15.1)
LIPASE: 36 U/L (ref 13–60)
Lab: ABNORMAL
MCH RBC QN AUTO: 28.9 PG (ref 25.2–33.5)
MCHC RBC AUTO-ENTMCNC: 32.5 G/DL (ref 28.4–34.8)
MCV RBC AUTO: 89 FL (ref 82.6–102.9)
NRBC AUTOMATED: 0 PER 100 WBC
PDW BLD-RTO: 12.5 % (ref 11.8–14.4)
PLATELET # BLD: 284 K/UL (ref 138–453)
PMV BLD AUTO: 8.5 FL (ref 8.1–13.5)
POTASSIUM SERPL-SCNC: 4.6 MMOL/L (ref 3.7–5.3)
RBC # BLD: 3.91 M/UL (ref 3.95–5.11)
SODIUM BLD-SCNC: 137 MMOL/L (ref 135–144)
SPECIMEN DESCRIPTION: ABNORMAL
TOTAL PROTEIN: 5.5 G/DL (ref 6.4–8.3)
WBC # BLD: 14.8 K/UL (ref 3.5–11.3)

## 2021-07-14 PROCEDURE — 64488 TAP BLOCK BI INJECTION: CPT | Performed by: NURSE ANESTHETIST, CERTIFIED REGISTERED

## 2021-07-14 PROCEDURE — 36415 COLL VENOUS BLD VENIPUNCTURE: CPT

## 2021-07-14 PROCEDURE — 2580000003 HC RX 258: Performed by: SURGERY

## 2021-07-14 PROCEDURE — 6360000002 HC RX W HCPCS: Performed by: NURSE ANESTHETIST, CERTIFIED REGISTERED

## 2021-07-14 PROCEDURE — 3600000009 HC SURGERY ROBOT BASE: Performed by: SURGERY

## 2021-07-14 PROCEDURE — 3700000001 HC ADD 15 MINUTES (ANESTHESIA): Performed by: SURGERY

## 2021-07-14 PROCEDURE — 1200000000 HC SEMI PRIVATE

## 2021-07-14 PROCEDURE — S2900 ROBOTIC SURGICAL SYSTEM: HCPCS | Performed by: SURGERY

## 2021-07-14 PROCEDURE — 94761 N-INVAS EAR/PLS OXIMETRY MLT: CPT

## 2021-07-14 PROCEDURE — 3700000000 HC ANESTHESIA ATTENDED CARE: Performed by: SURGERY

## 2021-07-14 PROCEDURE — 88304 TISSUE EXAM BY PATHOLOGIST: CPT

## 2021-07-14 PROCEDURE — 2500000003 HC RX 250 WO HCPCS: Performed by: NURSE ANESTHETIST, CERTIFIED REGISTERED

## 2021-07-14 PROCEDURE — 3600000019 HC SURGERY ROBOT ADDTL 15MIN: Performed by: SURGERY

## 2021-07-14 PROCEDURE — 7100000000 HC PACU RECOVERY - FIRST 15 MIN: Performed by: SURGERY

## 2021-07-14 PROCEDURE — 2500000003 HC RX 250 WO HCPCS: Performed by: SURGERY

## 2021-07-14 PROCEDURE — 6360000002 HC RX W HCPCS: Performed by: SURGERY

## 2021-07-14 PROCEDURE — 6370000000 HC RX 637 (ALT 250 FOR IP): Performed by: SURGERY

## 2021-07-14 PROCEDURE — 2709999900 HC NON-CHARGEABLE SUPPLY: Performed by: SURGERY

## 2021-07-14 PROCEDURE — 85027 COMPLETE CBC AUTOMATED: CPT

## 2021-07-14 PROCEDURE — 7100000001 HC PACU RECOVERY - ADDTL 15 MIN: Performed by: SURGERY

## 2021-07-14 PROCEDURE — 80053 COMPREHEN METABOLIC PANEL: CPT

## 2021-07-14 PROCEDURE — 83690 ASSAY OF LIPASE: CPT

## 2021-07-14 RX ORDER — FENTANYL CITRATE 50 UG/ML
INJECTION, SOLUTION INTRAMUSCULAR; INTRAVENOUS PRN
Status: DISCONTINUED | OUTPATIENT
Start: 2021-07-14 | End: 2021-07-14 | Stop reason: SDUPTHER

## 2021-07-14 RX ORDER — ONDANSETRON 2 MG/ML
INJECTION INTRAMUSCULAR; INTRAVENOUS PRN
Status: DISCONTINUED | OUTPATIENT
Start: 2021-07-14 | End: 2021-07-14 | Stop reason: SDUPTHER

## 2021-07-14 RX ORDER — HYDROCODONE BITARTRATE AND ACETAMINOPHEN 5; 325 MG/1; MG/1
1 TABLET ORAL EVERY 6 HOURS PRN
Qty: 12 TABLET | Refills: 0 | Status: SHIPPED | OUTPATIENT
Start: 2021-07-14 | End: 2021-07-17

## 2021-07-14 RX ORDER — LIDOCAINE HYDROCHLORIDE 20 MG/ML
INJECTION, SOLUTION EPIDURAL; INFILTRATION; INTRACAUDAL; PERINEURAL PRN
Status: DISCONTINUED | OUTPATIENT
Start: 2021-07-14 | End: 2021-07-14 | Stop reason: SDUPTHER

## 2021-07-14 RX ORDER — ROCURONIUM BROMIDE 10 MG/ML
INJECTION, SOLUTION INTRAVENOUS PRN
Status: DISCONTINUED | OUTPATIENT
Start: 2021-07-14 | End: 2021-07-14 | Stop reason: SDUPTHER

## 2021-07-14 RX ORDER — GLYCOPYRROLATE 1 MG/5 ML
SYRINGE (ML) INTRAVENOUS PRN
Status: DISCONTINUED | OUTPATIENT
Start: 2021-07-14 | End: 2021-07-14 | Stop reason: SDUPTHER

## 2021-07-14 RX ORDER — DEXAMETHASONE SODIUM PHOSPHATE 4 MG/ML
INJECTION, SOLUTION INTRA-ARTICULAR; INTRALESIONAL; INTRAMUSCULAR; INTRAVENOUS; SOFT TISSUE PRN
Status: DISCONTINUED | OUTPATIENT
Start: 2021-07-14 | End: 2021-07-14 | Stop reason: SDUPTHER

## 2021-07-14 RX ORDER — METOCLOPRAMIDE HYDROCHLORIDE 5 MG/ML
10 INJECTION INTRAMUSCULAR; INTRAVENOUS
Status: DISCONTINUED | OUTPATIENT
Start: 2021-07-14 | End: 2021-07-14 | Stop reason: HOSPADM

## 2021-07-14 RX ORDER — FENTANYL CITRATE 50 UG/ML
50 INJECTION, SOLUTION INTRAMUSCULAR; INTRAVENOUS EVERY 5 MIN PRN
Status: DISCONTINUED | OUTPATIENT
Start: 2021-07-14 | End: 2021-07-14 | Stop reason: HOSPADM

## 2021-07-14 RX ORDER — PROMETHAZINE HYDROCHLORIDE 25 MG/ML
6.25 INJECTION, SOLUTION INTRAMUSCULAR; INTRAVENOUS
Status: DISCONTINUED | OUTPATIENT
Start: 2021-07-14 | End: 2021-07-14 | Stop reason: HOSPADM

## 2021-07-14 RX ORDER — DEXAMETHASONE SODIUM PHOSPHATE 4 MG/ML
INJECTION, SOLUTION INTRA-ARTICULAR; INTRALESIONAL; INTRAMUSCULAR; INTRAVENOUS; SOFT TISSUE PRN
Status: DISCONTINUED | OUTPATIENT
Start: 2021-07-14 | End: 2021-07-14

## 2021-07-14 RX ORDER — ROPIVACAINE HYDROCHLORIDE 5 MG/ML
INJECTION, SOLUTION EPIDURAL; INFILTRATION; PERINEURAL PRN
Status: DISCONTINUED | OUTPATIENT
Start: 2021-07-14 | End: 2021-07-14

## 2021-07-14 RX ORDER — ROPIVACAINE HYDROCHLORIDE 5 MG/ML
INJECTION, SOLUTION EPIDURAL; INFILTRATION; PERINEURAL PRN
Status: DISCONTINUED | OUTPATIENT
Start: 2021-07-14 | End: 2021-07-14 | Stop reason: SDUPTHER

## 2021-07-14 RX ORDER — MIDAZOLAM HYDROCHLORIDE 1 MG/ML
INJECTION INTRAMUSCULAR; INTRAVENOUS PRN
Status: DISCONTINUED | OUTPATIENT
Start: 2021-07-14 | End: 2021-07-14 | Stop reason: SDUPTHER

## 2021-07-14 RX ORDER — GINSENG 100 MG
CAPSULE ORAL PRN
Status: DISCONTINUED | OUTPATIENT
Start: 2021-07-14 | End: 2021-07-14 | Stop reason: HOSPADM

## 2021-07-14 RX ORDER — PROPOFOL 10 MG/ML
INJECTION, EMULSION INTRAVENOUS PRN
Status: DISCONTINUED | OUTPATIENT
Start: 2021-07-14 | End: 2021-07-14 | Stop reason: SDUPTHER

## 2021-07-14 RX ORDER — NEOSTIGMINE METHYLSULFATE 1 MG/ML
INJECTION, SOLUTION INTRAVENOUS PRN
Status: DISCONTINUED | OUTPATIENT
Start: 2021-07-14 | End: 2021-07-14 | Stop reason: SDUPTHER

## 2021-07-14 RX ORDER — INDOCYANINE GREEN AND WATER 25 MG
7.5 KIT INJECTION ONCE
Status: COMPLETED | OUTPATIENT
Start: 2021-07-14 | End: 2021-07-14

## 2021-07-14 RX ADMIN — DEXAMETHASONE SODIUM PHOSPHATE 4 MG: 4 INJECTION, SOLUTION INTRAMUSCULAR; INTRAVENOUS at 12:19

## 2021-07-14 RX ADMIN — ROCURONIUM BROMIDE 30 MG: 10 INJECTION, SOLUTION INTRAVENOUS at 11:59

## 2021-07-14 RX ADMIN — HYDROMORPHONE HYDROCHLORIDE 0.25 MG: 2 INJECTION, SOLUTION INTRAMUSCULAR; INTRAVENOUS; SUBCUTANEOUS at 02:31

## 2021-07-14 RX ADMIN — ROCURONIUM BROMIDE 20 MG: 10 INJECTION, SOLUTION INTRAVENOUS at 13:06

## 2021-07-14 RX ADMIN — CEFAZOLIN 2000 MG: 10 INJECTION, POWDER, FOR SOLUTION INTRAVENOUS at 11:48

## 2021-07-14 RX ADMIN — Medication 3 MG: at 13:49

## 2021-07-14 RX ADMIN — LIDOCAINE HYDROCHLORIDE 100 MG: 20 INJECTION, SOLUTION EPIDURAL; INFILTRATION; INTRACAUDAL; PERINEURAL at 11:59

## 2021-07-14 RX ADMIN — ROCURONIUM BROMIDE 30 MG: 10 INJECTION, SOLUTION INTRAVENOUS at 12:17

## 2021-07-14 RX ADMIN — INDOCYANINE GREEN AND WATER 7.5 MG: KIT at 11:47

## 2021-07-14 RX ADMIN — DEXAMETHASONE SODIUM PHOSPHATE 8 MG: 4 INJECTION, SOLUTION INTRA-ARTICULAR; INTRALESIONAL; INTRAMUSCULAR; INTRAVENOUS; SOFT TISSUE at 11:41

## 2021-07-14 RX ADMIN — FENTANYL CITRATE 50 MCG: 50 INJECTION, SOLUTION INTRAMUSCULAR; INTRAVENOUS at 11:41

## 2021-07-14 RX ADMIN — Medication 0.6 MG: at 13:49

## 2021-07-14 RX ADMIN — ROPIVACAINE HYDROCHLORIDE 50 ML: 5 INJECTION, SOLUTION EPIDURAL; INFILTRATION; PERINEURAL at 11:41

## 2021-07-14 RX ADMIN — PROPOFOL 100 MG: 10 INJECTION, EMULSION INTRAVENOUS at 11:59

## 2021-07-14 RX ADMIN — FENTANYL CITRATE 50 MCG: 50 INJECTION, SOLUTION INTRAMUSCULAR; INTRAVENOUS at 14:16

## 2021-07-14 RX ADMIN — MIDAZOLAM 1 MG: 1 INJECTION INTRAMUSCULAR; INTRAVENOUS at 11:41

## 2021-07-14 RX ADMIN — SODIUM CHLORIDE: 9 INJECTION, SOLUTION INTRAVENOUS at 22:31

## 2021-07-14 RX ADMIN — PIPERACILLIN AND TAZOBACTAM 3375 MG: 3; .375 INJECTION, POWDER, FOR SOLUTION INTRAVENOUS at 22:39

## 2021-07-14 RX ADMIN — ONDANSETRON 4 MG: 2 INJECTION INTRAMUSCULAR; INTRAVENOUS at 12:17

## 2021-07-14 RX ADMIN — PIPERACILLIN AND TAZOBACTAM 3375 MG: 3; .375 INJECTION, POWDER, FOR SOLUTION INTRAVENOUS at 14:55

## 2021-07-14 RX ADMIN — SODIUM CHLORIDE: 9 INJECTION, SOLUTION INTRAVENOUS at 09:00

## 2021-07-14 ASSESSMENT — PAIN SCALES - WONG BAKER
WONGBAKER_NUMERICALRESPONSE: 0

## 2021-07-14 ASSESSMENT — PAIN SCALES - GENERAL
PAINLEVEL_OUTOF10: 0
PAINLEVEL_OUTOF10: 5
PAINLEVEL_OUTOF10: 0
PAINLEVEL_OUTOF10: 8
PAINLEVEL_OUTOF10: 2
PAINLEVEL_OUTOF10: 0

## 2021-07-14 ASSESSMENT — PAIN DESCRIPTION - DESCRIPTORS
DESCRIPTORS: CONSTANT;DISCOMFORT
DESCRIPTORS: ACHING
DESCRIPTORS: CONSTANT

## 2021-07-14 ASSESSMENT — PAIN DESCRIPTION - ORIENTATION
ORIENTATION: MID

## 2021-07-14 ASSESSMENT — PAIN DESCRIPTION - PROGRESSION
CLINICAL_PROGRESSION: NOT CHANGED
CLINICAL_PROGRESSION: RAPIDLY IMPROVING
CLINICAL_PROGRESSION: RAPIDLY WORSENING

## 2021-07-14 ASSESSMENT — PAIN DESCRIPTION - LOCATION
LOCATION: ABDOMEN

## 2021-07-14 ASSESSMENT — PAIN DESCRIPTION - FREQUENCY
FREQUENCY: INTERMITTENT
FREQUENCY: CONTINUOUS

## 2021-07-14 ASSESSMENT — PAIN DESCRIPTION - PAIN TYPE
TYPE: SURGICAL PAIN
TYPE: SURGICAL PAIN

## 2021-07-14 ASSESSMENT — PAIN DESCRIPTION - ONSET
ONSET: ON-GOING
ONSET: ON-GOING

## 2021-07-14 NOTE — ANESTHESIA PROCEDURE NOTES
Peripheral Block    Patient location during procedure: pre-op  Start time: 7/14/2021 11:30 AM  End time: 7/14/2021 11:41 AM  Staffing  Performed: resident/CRNA   Resident/CRNA: YOSEF Peralta CRNA  Preanesthetic Checklist  Completed: patient identified, IV checked, site marked, risks and benefits discussed, surgical consent, monitors and equipment checked, pre-op evaluation, timeout performed, anesthesia consent given, oxygen available and patient being monitored  Peripheral Block  Patient position: supine  Prep: ChloraPrep  Patient monitoring: continuous pulse ox  Block type: TAP  Laterality: bilateral  Injection technique: single-shot  Guidance: ultrasound guided  Local infiltration: ropivacaine and decadron  Infiltration strength: 0.5 %  Dose: 50 mL  Provider prep: mask and sterile gloves  Local infiltration: ropivacaine and decadron  Needle  Needle type:  Other   Needle gauge: 20 G  Needle length: 11 cm  Needle localization: ultrasound guidanceOther needle type: pajunk  Assessment  Injection assessment: negative aspiration for heme, no paresthesia on injection and local visualized surrounding nerve on ultrasound  Slow fractionated injection: yes  Hemodynamics: stable  Reason for block: post-op pain management

## 2021-07-14 NOTE — PROGRESS NOTES
Piperacillin-Tazobactam Extended Interval Interchange    The following ordered dose of Piperacillin-Tazobactam has been changed to optimize its pharmacodynamic profile as approved by the Patient Care Review Committee. Ordered Dose    _x_ 3.375 gm IV q6 or 8hrs (30 minute infusion)      __ 4.5gm IV q6 or 8hrs  (30 minute infusion)      __ 2.25gm IV q6 or 8hrs (30 minute infusion)      New Dose    CrCl  ? 20ml/min    _x_ 3.375gm IV q8hrs  (4-hour infusion)      CrCl < 20ml/min     __ 3.375gm IV q12hrs  (4-hour infusion)      Pharmacists should be contacted for issues concerning drug compatibility with multiple IV medications. All doses will be prepared using 50ml D5W to be infused over 4-hours at a rate of 12.5ml/hr.     Thank Carrillo Whyte, RPH7/14/34268:54 PM

## 2021-07-14 NOTE — PROGRESS NOTES
Met with Patient this p.m. to discuss discharge planning. Patient is a 68year old , white female, admitted with a diagnosis of Acute Gallstone Pancreatitis. Patient is alert and oriented, very pleasant and cooperative with this assessment. States that she wishes to be discharged to her home when deemed medically stable. No additional services requested at this time. Patient lives alone in [place]. Son lives next door and daughter lives a few miles away. Good support noted by Patient of her children and grandchildren. Patient is a retired banker. She uses no DME at this time. Independent with her ADL's. Patient ordinarily does all of her own cooking and cleaning and does her own yard work. Grandchildren have been assisting since she's not been feeling well. Patient describes a very active lifestyle. She currently utilizes no outside resources or services. Patient drives herself and provides for her own transportation needs. PCP is Jose Quinones. Patient denies having any difficulty with regards to affording her medications. Discharge plan is home when stable. Patient is a 'Full Code' status and does have medical directives in place. No anticipated needs or concerns identified by Patient. Discussed home health care and Patient declines offer to arrange services at this point. LSW to monitor and assist with discharge planning needs as they arise.     Mac. [address]  7/14/2021

## 2021-07-14 NOTE — PROGRESS NOTES
Pt. Alert and oriented x4, just finished getting washed up for surgery. Pt. States she is feeling well and denies any pain. Pt. States she is ready to go to surgery. Vitals are assessed, vitals are WDL. Pt. Assessment is completed. Pt. Skin is pink warm and dry, lungs are clear bilaterally. Pt. Heart is regular. Pt. Abdomen is soft with some tenderness. Pt. Bowel sounds are hypoactive. Pt. Has no edema present. Pt. Has sequential devices placed at this time. Pt. Appears comfortable, denies any other needs. Pt. Call light is in reach.

## 2021-07-14 NOTE — ANESTHESIA PRE PROCEDURE
Department of Anesthesiology  Preprocedure Note       Name:  Karri Kimball   Age:  68 y.o.  :  1945                                          MRN:  [id number]         Date:  2021      Surgeon: Mell Dale):  Valentina White MD    Procedure: Procedure(s):  CHOLECYSTECTOMY LAPAROSCOPIC ROBOTIC ASSIST    Medications prior to admission:   Prior to Admission medications    Medication Sig Start Date End Date Taking?  Authorizing Provider   levoFLOXacin (LEVAQUIN) 500 MG tablet Take 1 tablet by mouth daily for 5 days 21  Tyson Bailey MD   metoprolol succinate (TOPROL XL) 25 MG extended release tablet Take 1 tablet by mouth daily 21   Tyson Bailey MD   potassium chloride (KLOR-CON M) 20 MEQ extended release tablet Take 2 tablets by mouth daily for 10 days 21  Tyson Bailey MD   ibuprofen (ADVIL;MOTRIN) 200 MG tablet Take 200 mg by mouth every 6 hours as needed for Pain    Historical Provider, MD   amLODIPine (NORVASC) 10 MG tablet Take 10 mg by mouth daily    Historical Provider, MD   lisinopril (PRINIVIL;ZESTRIL) 10 MG tablet Take 10 mg by mouth daily    Historical Provider, MD   levothyroxine (SYNTHROID) 125 MCG tablet Take 125 mcg by mouth Daily    Historical Provider, MD   thyroid (ARMOUR) 30 MG tablet Take 30 mg by mouth daily    Historical Provider, MD       Current medications:    Current Facility-Administered Medications   Medication Dose Route Frequency Provider Last Rate Last Admin    [facility] Hold] 0.9 % sodium chloride infusion   Intravenous Continuous Valentina White MD 75 mL/hr at 21 0900 New Bag at 21 0900    [[facility] Hold] sodium chloride flush 0.9 % injection 5-40 mL  5-40 mL Intravenous 2 times per day Valentina White MD        [facility] Hold] sodium chloride flush 0.9 % injection 5-40 mL  5-40 mL Intravenous PRN Valentina White MD        [facility] Hold] 0.9 % sodium chloride infusion  25 mL Intravenous PRN Valentina White MD        [facility] Hold] potassium chloride 10 mEq/100 mL IVPB (Peripheral Line)  10 mEq Intravenous PRN Renetta Crews MD        [facility] Hold] magnesium sulfate 1000 mg in dextrose 5% 100 mL IVPB  1,000 mg Intravenous PRN Renetta Crews MD        [facility] Hold] HYDROmorphone (DILAUDID) injection 0.25 mg  0.25 mg Intravenous Q3H PRN Renetta Crews MD   0.25 mg at 07/14/21 0231    Or    [[facility] Hold] HYDROmorphone (DILAUDID) injection 0.5 mg  0.5 mg Intravenous Q3H PRN Renetta Crews MD        [facility] Hold] ondansetron (ZOFRAN-ODT) disintegrating tablet 4 mg  4 mg Oral Q8H PRN Renetta Crews MD        Or   [facility] Hold] ondansetron [facility]) injection 4 mg  4 mg Intravenous Q6H PRN Renetta Crews MD           Allergies:  No Known Allergies    Problem List:    Patient Active Problem List   Diagnosis Code    Idiopathic acute pancreatitis K85.00    Mild malnutrition (HCC) E44.1    SVT (supraventricular tachycardia) (HCC) I47.1    Acute gallstone pancreatitis K85.10    Acute cholecystitis K81.0       Past Medical History:        Diagnosis Date    Hypertension     Thyroid disease        Past Surgical History:        Procedure Laterality Date    APPENDECTOMY      HYSTERECTOMY      JOINT REPLACEMENT         Social History:    Social History     Tobacco Use    Smoking status: Never Smoker    Smokeless tobacco: Never Used   Substance Use Topics    Alcohol use: Not on file                                Counseling given: Not Answered      Vital Signs (Current):   Vitals:    07/14/21 0057 07/14/21 0448 07/14/21 0700 07/14/21 1044   BP: (!) 150/72  130/68 (!) 140/59   Pulse: 86  88 86   Resp: 15  16 16   Temp: 36.4 °C (97.5 °F)  36.3 °C (97.3 °F) 37.1 °C (98.7 °F)   TempSrc: Temporal  Temporal Temporal   SpO2: 95%  94% 94%   Weight:  187 lb (84.8 kg)     Height:                                                  BP Readings from Last 3 Encounters:   07/14/21 (!) 140/59   07/13/21 133/60       NPO Status: Time of last liquid consumption: reviewed  Rhythm: regular  Rate: normal           Beta Blocker:  Dose within 24 Hrs         Neuro/Psych:   Negative Neuro/Psych ROS              GI/Hepatic/Renal:            ROS comment: Cholelithiasis with cholecystitis. Endo/Other: Negative Endo/Other ROS                    Abdominal:             Vascular: negative vascular ROS. Other Findings:             Anesthesia Plan      general     ASA 3       Induction: intravenous. Anesthetic plan and risks discussed with patient.                       YOSEF Hdz - LETY   7/14/2021

## 2021-07-14 NOTE — ANESTHESIA POSTPROCEDURE EVALUATION
Department of Anesthesiology  Postprocedure Note    Patient: Karri Kimball  MRN: [id number]  YOB: 1945  Date of evaluation: 7/14/2021  Time:  3:32 PM     Procedure Summary     Date: 07/14/21 Room / Location: [facility] OR 2 / [facility]    Anesthesia Start: 1488 Anesthesia Stop: 9815    Procedure: CHOLECYSTECTOMY LAPAROSCOPIC ROBOTIC ASSIST (N/A ) Diagnosis: (ACUTE PANCREATITIS SECONDARY TO CHOLEITHIASIS WITH CHOLECYSTITIS)    Surgeons: Valentina White MD Responsible Provider: Rohan Rider. YOSEF Reece - CRNA    Anesthesia Type: general ASA Status: 3          Anesthesia Type: general    Ziggy Phase I: Ziggy Score: 10    Ziggy Phase II:      Last vitals: Reviewed and per EMR flowsheets.        Anesthesia Post Evaluation    Patient location during evaluation: bedside  Patient participation: complete - patient participated  Level of consciousness: awake and alert  Pain score: 0  Airway patency: patent  Nausea & Vomiting: no nausea and no vomiting  Complications: no  Cardiovascular status: hemodynamically stable  Respiratory status: acceptable  Hydration status: euvolemic

## 2021-07-14 NOTE — CONSULTS
YOSEF Deutsch-CNP  Inpatient Consult Note 7/14/21    Patient:  Kristina Gonsalez  MRN: [id number]    Reason for Consultation: Medical management    Requesting Physician: Yevgeniy Liu    History Obtained From:  patient, electronic medical record  PCP: Gustavo Monzon    History of Present Illness: The patient is a 68 y.o. female who presented for admission from [facility].  Patient was admitted originally for mid epigastric abdominal pain. During her evaluation in the emergency room she was found to have acute pancreatitis elevated WBCs of 18,000 and her lipase was greater than 3000 with a normal bilirubin. Ultrasound of her gallbladder confirmed cholelithiasis with cholecystitis and mild common bile duct dilatation to 9 mm. Patient had no previous history of pancreatitis. She states she rarely drinks alcohol and has no history of alcohol abuse. Patient has had poor appetite and reports no food since last Thursday. She denies history of Covid. Rapid Covid was negative. Patient was admitted to [place] for plan for cholecystectomy today. Patient is afebrile. Denies nausea or vomiting at this time. Past Medical History:        Diagnosis Date    Hypertension     Thyroid disease        Past Surgical History:        Procedure Laterality Date    APPENDECTOMY      CHOLECYSTECTOMY  07/14/2021    CHOLECYSTECTOMY LAPAROSCOPIC ROBOTIC ASSIST     HYSTERECTOMY      JOINT REPLACEMENT         Medications Prior to Admission:    Prior to Admission medications    Medication Sig Start Date End Date Taking? Authorizing Provider   HYDROcodone-acetaminophen (NORCO) 5-325 MG per tablet Take 1 tablet by mouth every 6 hours as needed for Pain for up to 3 days. May take up to 2 tablets po every 6 hours as needed for pain.  7/14/21 7/17/21 Yes Maria Antonia Ness MD   levoFLOXacin (LEVAQUIN) 500 MG tablet Take 1 tablet by mouth daily for 5 days 7/13/21 7/18/21  Anthony Harkins MD   metoprolol succinate (Lucero Janine XL) 25 MG extended release tablet Take 1 tablet by mouth daily 7/13/21   Anthony Harkins MD   potassium chloride (KLOR-CON M) 20 MEQ extended release tablet Take 2 tablets by mouth daily for 10 days 7/13/21 7/23/21  Anthony Harkins MD   ibuprofen (ADVIL;MOTRIN) 200 MG tablet Take 200 mg by mouth every 6 hours as needed for Pain    Historical Provider, MD   amLODIPine (NORVASC) 10 MG tablet Take 10 mg by mouth daily    Historical Provider, MD   lisinopril (PRINIVIL;ZESTRIL) 10 MG tablet Take 10 mg by mouth daily    Historical Provider, MD   levothyroxine (SYNTHROID) 125 MCG tablet Take 125 mcg by mouth Daily    Historical Provider, MD   thyroid (ARMOUR) 30 MG tablet Take 30 mg by mouth daily    Historical Provider, MD       Allergies:  Patient has no known allergies. Social History:   TOBACCO:   reports that she has never smoked. She has never used smokeless tobacco.  ETOH:   has no history on file for alcohol use. Family History:   History reviewed. No pertinent family history. Review of Systems:  Pertinent items are noted in HPI. Objective:    Vitals:   Vitals:    07/14/21 1415   BP: (!) 103/57   Pulse: 82   Resp: 16   Temp:    SpO2: 94%     Weight: 187 lb (84.8 kg)   Height: 5' 10\" (177.8 cm)   -----------------------------------------------------------------  Exam:  General Appearance:  awake, alert, oriented, in no acute distress, well developed, well nourished and in no acute distress  Head/face:  NCAT  Eyes:  No gross abnormalities. , PERRL and EOMI  Nose/Sinuses:  negative  Mouth/Throat:  Mucosa moist.  No lesions. Pharynx without erythema, edema or exudate. Neck:  neck- supple, no mass, non-tender and no bruits  Lungs:  Normal expansion. Clear to auscultation. No rales, rhonchi, or wheezing. Heart:  Heart sounds are normal.  Regular rate and rhythm without murmur, gallop or rub.   Abdomen:  Soft, slight tenderness in right upper quadrant and epigastric area, no rebound tenderness or rigidity, normal bowel sounds. No bruits, organomegaly or masses. Extremities: Extremities warm to touch, pink, with no edema. and pulses present in all extremities  Musculoskeletal:  negative  Peripheral Pulses:  Normal, Capillary refill <2secs, strong peripheral pulses  Neurologic:  negative  Skin:  Skin color, texture, turgor normal. No rashes or lesions.      -----------------------------------------------------------------  Diagnostic Data: Reviewed    Assessment:          Principal Problem:    Acute gallstone pancreatitis  Active Problems:    Acute cholecystitis    Hypertension    Thyroid disease  Resolved Problems:    * No resolved hospital problems. *      Recommendation:     · Acute cholecystitis  · Appreciate general surgery  · OR today  · N.p.o.  · Continue IV fluids  · Continue IV Zosyn  · Acute gallstone pancreatitis  · Trend labs  · Hypertension  · Hold home meds  · Blood pressure stable  · Thyroid disease  · Hold home meds at this time  · Thank you very much for allowing me to participate in the care of this patient.      Keron Barragan, APRN - CNP, APRN-CNP

## 2021-07-14 NOTE — PLAN OF CARE
Problem: Pain:  Description: Pain management should include both nonpharmacologic and pharmacologic interventions. Goal: Pain level will decrease  Description: Pain level will decrease  7/14/2021 0819 by Anthony De La Cruz RN  Note: Pain assessed every four hours and as needed using 0-10 pain scale. Pt educated on scale and uses scale appropriately. Encourage pt to notify staff of pain before pain becomes uncontrollable. Correlate periods of heavy activity after pain medication administration. Use pharmacological and non pharmacological methods for pain relief such as: warm blankets, ice, television, reading, or rest.          Problem: Falls - Risk of:  Goal: Will remain free from falls  Description: Will remain free from falls  7/14/2021 0819 by Anthony De La Cruz RN  Note: Call light in reach at all times, frequent checks, bed in lowest position, wheels of bed and chair locked, non skid footwear on, appropriate transfer techniques, personal items within reach, walkways free of obstructions, fall risk armband and sign displayed, Wells fall risk score per protocol. No falls this shift, will continue to monitor. Problem: HEMODYNAMIC STATUS  Goal: Patient has stable vital signs and fluid balance  7/14/2021 0819 by Anthony De La Cruz RN  Note: Monitor I and O     Problem: OXYGENATION/RESPIRATORY FUNCTION  Goal: Patient will achieve/maintain normal respiratory rate/effort  7/14/2021 0819 by Anthony De La Cruz RN  Note: Monitor pulse ox and respirations     Problem: SKIN INTEGRITY  Goal: Skin integrity is maintained or improved  7/14/2021 0819 by Anthony De La Cruz RN  Note: Zackery scale monitoring per protocol. Inspect skin for breakdown frequently. Encourage pt to make frequent large adjustments in position or assist patient with turning. Document all areas of breakdown.

## 2021-07-14 NOTE — PROGRESS NOTES
Patient off the floor for surgery. Will assess at earliest convenience for discharge planning needs.     [address]  7/14/2021

## 2021-07-15 VITALS
OXYGEN SATURATION: 99 % | RESPIRATION RATE: 16 BRPM | HEART RATE: 78 BPM | DIASTOLIC BLOOD PRESSURE: 77 MMHG | BODY MASS INDEX: 27.06 KG/M2 | TEMPERATURE: 98.6 F | HEIGHT: 70 IN | WEIGHT: 189 LBS | SYSTOLIC BLOOD PRESSURE: 130 MMHG

## 2021-07-15 LAB
ABSOLUTE EOS #: <0.03 K/UL (ref 0–0.44)
ABSOLUTE IMMATURE GRANULOCYTE: 0.06 K/UL (ref 0–0.3)
ABSOLUTE LYMPH #: 1.37 K/UL (ref 1.1–3.7)
ABSOLUTE MONO #: 0.99 K/UL (ref 0.1–1.2)
ALBUMIN SERPL-MCNC: 3.1 G/DL (ref 3.5–5.2)
ALBUMIN/GLOBULIN RATIO: 1.1 (ref 1–2.5)
ALP BLD-CCNC: 85 U/L (ref 35–104)
ALT SERPL-CCNC: 25 U/L (ref 5–33)
ANION GAP SERPL CALCULATED.3IONS-SCNC: 13 MMOL/L (ref 9–17)
AST SERPL-CCNC: 15 U/L
BASOPHILS # BLD: 0 % (ref 0–2)
BASOPHILS ABSOLUTE: <0.03 K/UL (ref 0–0.2)
BILIRUB SERPL-MCNC: 0.33 MG/DL (ref 0.3–1.2)
BUN BLDV-MCNC: 10 MG/DL (ref 8–23)
BUN/CREAT BLD: 21 (ref 9–20)
CALCIUM SERPL-MCNC: 8.5 MG/DL (ref 8.6–10.4)
CHLORIDE BLD-SCNC: 102 MMOL/L (ref 98–107)
CO2: 22 MMOL/L (ref 20–31)
CREAT SERPL-MCNC: 0.48 MG/DL (ref 0.5–0.9)
DIFFERENTIAL TYPE: ABNORMAL
EOSINOPHILS RELATIVE PERCENT: 0 % (ref 1–4)
GFR AFRICAN AMERICAN: >60 ML/MIN
GFR NON-AFRICAN AMERICAN: >60 ML/MIN
GFR SERPL CREATININE-BSD FRML MDRD: ABNORMAL ML/MIN/{1.73_M2}
GFR SERPL CREATININE-BSD FRML MDRD: ABNORMAL ML/MIN/{1.73_M2}
GLUCOSE BLD-MCNC: 95 MG/DL (ref 70–99)
HCT VFR BLD CALC: 32.6 % (ref 36.3–47.1)
HEMOGLOBIN: 10.7 G/DL (ref 11.9–15.1)
IMMATURE GRANULOCYTES: 1 %
LIPASE: 23 U/L (ref 13–60)
LYMPHOCYTES # BLD: 12 % (ref 24–43)
MCH RBC QN AUTO: 28.5 PG (ref 25.2–33.5)
MCHC RBC AUTO-ENTMCNC: 32.8 G/DL (ref 28.4–34.8)
MCV RBC AUTO: 86.7 FL (ref 82.6–102.9)
MONOCYTES # BLD: 9 % (ref 3–12)
NRBC AUTOMATED: 0 PER 100 WBC
PDW BLD-RTO: 12.6 % (ref 11.8–14.4)
PLATELET # BLD: 322 K/UL (ref 138–453)
PLATELET ESTIMATE: ABNORMAL
PMV BLD AUTO: 8.3 FL (ref 8.1–13.5)
POTASSIUM SERPL-SCNC: 3.7 MMOL/L (ref 3.7–5.3)
RBC # BLD: 3.76 M/UL (ref 3.95–5.11)
RBC # BLD: ABNORMAL 10*6/UL
SEG NEUTROPHILS: 78 % (ref 36–65)
SEGMENTED NEUTROPHILS ABSOLUTE COUNT: 9.07 K/UL (ref 1.5–8.1)
SODIUM BLD-SCNC: 137 MMOL/L (ref 135–144)
TOTAL PROTEIN: 5.9 G/DL (ref 6.4–8.3)
WBC # BLD: 11.5 K/UL (ref 3.5–11.3)
WBC # BLD: ABNORMAL 10*3/UL

## 2021-07-15 PROCEDURE — 85025 COMPLETE CBC W/AUTO DIFF WBC: CPT

## 2021-07-15 PROCEDURE — 80053 COMPREHEN METABOLIC PANEL: CPT

## 2021-07-15 PROCEDURE — 6360000002 HC RX W HCPCS: Performed by: SURGERY

## 2021-07-15 PROCEDURE — 83690 ASSAY OF LIPASE: CPT

## 2021-07-15 PROCEDURE — 99024 POSTOP FOLLOW-UP VISIT: CPT | Performed by: SURGERY

## 2021-07-15 PROCEDURE — 36415 COLL VENOUS BLD VENIPUNCTURE: CPT

## 2021-07-15 PROCEDURE — 2580000003 HC RX 258: Performed by: SURGERY

## 2021-07-15 RX ADMIN — PIPERACILLIN AND TAZOBACTAM 3375 MG: 3; .375 INJECTION, POWDER, FOR SOLUTION INTRAVENOUS at 06:56

## 2021-07-15 RX ADMIN — HYDROMORPHONE HYDROCHLORIDE 0.25 MG: 2 INJECTION, SOLUTION INTRAMUSCULAR; INTRAVENOUS; SUBCUTANEOUS at 01:58

## 2021-07-15 ASSESSMENT — PAIN DESCRIPTION - LOCATION: LOCATION: ABDOMEN

## 2021-07-15 ASSESSMENT — PAIN DESCRIPTION - DESCRIPTORS: DESCRIPTORS: CONSTANT;DISCOMFORT;ACHING

## 2021-07-15 ASSESSMENT — PAIN DESCRIPTION - FREQUENCY: FREQUENCY: INTERMITTENT

## 2021-07-15 ASSESSMENT — PAIN DESCRIPTION - PROGRESSION
CLINICAL_PROGRESSION: GRADUALLY WORSENING

## 2021-07-15 ASSESSMENT — PAIN SCALES - GENERAL
PAINLEVEL_OUTOF10: 6
PAINLEVEL_OUTOF10: 0
PAINLEVEL_OUTOF10: 6

## 2021-07-15 ASSESSMENT — PAIN DESCRIPTION - PAIN TYPE: TYPE: SURGICAL PAIN

## 2021-07-15 ASSESSMENT — PAIN DESCRIPTION - ONSET: ONSET: ON-GOING

## 2021-07-15 ASSESSMENT — PAIN DESCRIPTION - ORIENTATION: ORIENTATION: MID

## 2021-07-15 NOTE — PROGRESS NOTES
Pt resting in bed, awake, up independently, denies pain t/o am, dressing to midline abdomen C/D/I. Call light within reach. Denies needs. Will continue to monitor.

## 2021-07-15 NOTE — PROGRESS NOTES
Patient is walking to the bathroom with nurse standby. Writer assessed patient's surgical sites and found that patient had bled a small amount around the last three staples. Writer put a nonadherent dressing over the site and an ABD. Patient has not complained of pain. She ambulates well and expressed a desire to walk around the halls tomorrow. Writer educated patient on deep breathing and the benefits of ambulation. Patient accepted teaching. Vitals are stable and patient denies needs at this time. Will continue to monitor and assess.

## 2021-07-15 NOTE — PROGRESS NOTES
Comprehensive Nutrition Assessment    Type and Reason for Visit:  Initial, Patient Education    Nutrition Recommendations/Plan:  Low fat diet progression    Nutrition Assessment:  Mild malnutrition r/t inadequate nutrient intakes, AEB PO <50% for last 2 weeks, extended by NPO status and liquid PO during admission. Is s/p cristina now with weight increases which may reflect surgical fluid provision. Accepting of low fat education. Desire diet progression to low fat after demonstrated tolerance of liquids. Malnutrition Assessment:  Malnutrition Status:  Mild malnutrition    Context:  Acute Illness     Findings of the 6 clinical characteristics of malnutrition:  Energy Intake:  7 - 50% or less of estimated energy requirements for 5 or more days  Weight Loss:  No significant weight loss     Body Fat Loss:  No significant body fat loss     Muscle Mass Loss:  No significant muscle mass loss    Fluid Accumulation:  No significant fluid accumulation     Strength:  Not Performed    Estimated Daily Nutrient Needs:  Energy (kcal):  0910-7253 (20-25); Weight Used for Energy Requirements:  Usual     Protein (g):   (1.3-1.5); Weight Used for Protein Requirements:  Ideal        Fluid (ml/day):  2000; Method Used for Fluid Requirements:  1 ml/kcal      Nutrition Related Findings:  lower lean body mass (muscle)      Wounds:  Surgical Incision       Current Nutrition Therapies:    ADULT DIET; Full Liquid    Anthropometric Measures:  · Height: 5' 10\" (177.8 cm)  · Current Body Weight: 189 lb (85.7 kg)   · Admission Body Weight: 175 lb (79.4 kg) (in [place])    · Usual Body Weight: 178 lb (80.7 kg)     · Ideal Body Weight: 150 lbs; % Ideal Body Weight 126 %   · BMI: 27.1  · Adjusted Body Weight:  ; No Adjustment   · BMI Categories: Overweight (BMI 25.0-29. 9)       Nutrition Diagnosis:   · Mild malnutrition related to inadequate protein-energy intake as evidenced by poor intake prior to admission, intake 26-50%, intake 0-25%    Lab Results   Component Value Date     07/14/2021    K 4.6 07/14/2021     07/14/2021    CO2 23 07/14/2021    BUN 6 (L) 07/14/2021    CREATININE 0.50 07/14/2021    GLUCOSE 92 07/14/2021    CALCIUM 8.2 (L) 07/14/2021    PROT 5.5 (L) 07/14/2021    LABALBU 2.9 (L) 07/14/2021    BILITOT 0.47 07/14/2021    ALKPHOS 95 07/14/2021    AST 18 07/14/2021    ALT 39 (H) 07/14/2021    LABGLOM >60 07/14/2021    GFRAA >60 07/14/2021    GLOB NOT REPORTED 07/13/2021     No results found for: LABA1C  No results found for: EAG  No results found for: VITD25    Nutrition Interventions:   Food and/or Nutrient Delivery:  Modify Current Diet (advance diet to low fat)  Nutrition Education/Counseling:  Education initiated   Coordination of Nutrition Care:  Continue to monitor while inpatient    Goals:  PO >75% meals on low fat diet       Nutrition Monitoring and Evaluation:   Behavioral-Environmental Outcomes:  None Identified   Food/Nutrient Intake Outcomes:  Food and Nutrient Intake  Physical Signs/Symptoms Outcomes:  Biochemical Data, Weight     Discharge Planning:    No discharge needs at this time     Electronically signed by Shemar Garcia RD, LD on 7/15/21 at 7:59 AM EDT    Contact: [phone]

## 2021-07-15 NOTE — PROGRESS NOTES
Dilaudid given for pain 6/10 at this time for abdominal pain per patient request. Patient's abdominal ABD remains clean, dry, and intact. Patient denies further needs.

## 2021-07-15 NOTE — CONSULTS
Jossy Benitez, APRN-CNP  Inpatient Consult Note 7/15/21    Patient:  Benitez Hooks  MRN: [id number]    Reason for Consultation: Medical management    Requesting Physician: Anabela Singh    History Obtained From:  patient, electronic medical record  PCP: Nhi Robles    History of Present Illness: The patient is a 68 y.o. female who underwent laparoscopic cholecystectomy yesterday. States she feels good. No flatus but is belching. Ambulates often and tolerates well. Afebrile. Pain controlled. Denies n/v.      Past Medical History:        Diagnosis Date    Hypertension     Thyroid disease        Past Surgical History:        Procedure Laterality Date    APPENDECTOMY      CHOLECYSTECTOMY  07/14/2021    CHOLECYSTECTOMY LAPAROSCOPIC ROBOTIC ASSIST     CHOLECYSTECTOMY, LAPAROSCOPIC N/A 7/14/2021    CHOLECYSTECTOMY LAPAROSCOPIC ROBOTIC ASSIST performed by Satinder Carlson MD at [address]         Medications Prior to Admission:    Prior to Admission medications    Medication Sig Start Date End Date Taking? Authorizing Provider   HYDROcodone-acetaminophen (NORCO) 5-325 MG per tablet Take 1 tablet by mouth every 6 hours as needed for Pain for up to 3 days. May take up to 2 tablets po every 6 hours as needed for pain.  7/14/21 7/17/21 Yes Satinder Carlson MD   levoFLOXacin (LEVAQUIN) 500 MG tablet Take 1 tablet by mouth daily for 5 days 7/13/21 7/18/21  Mack Diaz MD   metoprolol succinate (TOPROL XL) 25 MG extended release tablet Take 1 tablet by mouth daily 7/13/21   Mack Diaz MD   potassium chloride (KLOR-CON M) 20 MEQ extended release tablet Take 2 tablets by mouth daily for 10 days 7/13/21 7/23/21  Mack Diaz MD   ibuprofen (ADVIL;MOTRIN) 200 MG tablet Take 200 mg by mouth every 6 hours as needed for Pain    Historical Provider, MD   amLODIPine (NORVASC) 10 MG tablet Take 10 mg by mouth daily    Historical Provider, MD   lisinopril (PRINIVIL;ZESTRIL) 10 MG tablet Take 10 mg by mouth daily    Historical Provider, MD   levothyroxine (SYNTHROID) 125 MCG tablet Take 125 mcg by mouth Daily    Historical Provider, MD   thyroid (ARMOUR) 30 MG tablet Take 30 mg by mouth daily    Historical Provider, MD       Allergies:  Patient has no known allergies. Social History:   TOBACCO:   reports that she has never smoked. She has never used smokeless tobacco.  ETOH:   has no history on file for alcohol use. Family History:   History reviewed. No pertinent family history. Review of Systems:  Pertinent items are noted in HPI. Objective:    Vitals:   Vitals:    07/15/21 0700   BP: 130/77   Pulse: 78   Resp: 16   Temp: 98.6 °F (37 °C)   SpO2: 99%     Weight: 189 lb (85.7 kg)   Height: 5' 10\" (177.8 cm)   -----------------------------------------------------------------  Exam:  General Appearance:  awake, alert, oriented, in no acute distress, well developed, well nourished and in no acute distress  Head/face:  NCAT  Eyes:  No gross abnormalities. , PERRL and EOMI  Nose/Sinuses:  negative  Mouth/Throat:  Mucosa moist.  No lesions. Pharynx without erythema, edema or exudate. Neck:  neck- supple, no mass, non-tender and no bruits  Lungs:  Normal expansion. Clear to auscultation. No rales, rhonchi, or wheezing. Heart:  Heart sounds are normal.  Regular rate and rhythm without murmur, gallop or rub. Abdomen:  Soft, slight tenderness, dressings CDI, normal bowel sounds. No bruits, organomegaly or masses. Extremities: Extremities warm to touch, pink, with no edema.  and pulses present in all extremities  Musculoskeletal:  negative  Peripheral Pulses:  Normal, Capillary refill <2secs, strong peripheral pulses  Neurologic:  negative  Skin:  Skin color, texture, turgor normal. No rashes or lesions.      -----------------------------------------------------------------  Diagnostic Data: Reviewed    Assessment:          Principal Problem:    Acute gallstone pancreatitis  Active Problems:    Mild malnutrition ([facility] [address]    Acute cholecystitis    Hypertension    Thyroid disease  Resolved Problems:    * No resolved hospital problems. *      Recommendation:     · Acute cholecystitis  · Appreciate general surgery  · Regular diet  · Continue IV fluids  · Continue IV Zosyn  · Acute gallstone pancreatitis  · Trend labs  · Hypertension  · Hold home meds  · Blood pressure stable  · Thyroid disease  · Hold home meds at this time  · Cleared for discharge by hospitalist  · Thank you very much for allowing me to participate in the care of this patient.      Elza Can, APRN - CNP, APRN-CNP

## 2021-07-15 NOTE — DISCHARGE INSTR - DIET

## 2021-07-15 NOTE — OP NOTE
of bleeding, infection, bowel/bile duct injury, COVID-19  exposure/infection, etc., the patient was taken to the operating theater  and placed in the supine position on the operating table. She received  preoperative IV antibiotics and ANISHA sequentials. Following smooth  induction of general anesthesia, she was positioned, prepped, and draped  in standard fashion. An incision was carried down through the skin and  subcutaneous tissues just below the umbilicus. It was deepened bluntly  to the rectus fascia. Stay sutures of 0 Vicryl were placed on the  fascia with anterior traction along the stay sutures. The posterior  rectus sheath was opened sharply along with the peritoneum to gain entry  to the abdominal cavity. A finger sweep showed no significant anterior  adhesions. A 12-mm balloon Visiport was then placed under direct vision  into the abdominal cavity. A pneumoperitoneum was then established to  15 mmHg. Two 8-mm ports were placed through the right and left lateral  abdominal walls, anterior axillary lines with a third 8-mm port in the  right upper quadrant midclavicular line, all under direct vision. The  patient was placed in reverse Trendelenburg, left side down. Attention  was directed to the gallbladder. It was found to be grossly distended  with dense inflammatory omental adhesions to the undersurface of the  liver and gallbladder. Inflammatory ascites was noted. This was  suctioned clear. The omentum was gently peeled from the undersurface of  the liver and gallbladder. This allowed superior retraction of the  gallbladder fundus. There were areas of patchy necrosis. The cystic  duct and artery were meticulously dissected from the surrounding  tissues. These two structures and only these two structures were seen  going directly to the gallbladder. This was confirmed with  intraoperative indocyanine green fluorescence.   The cystic duct and  artery were clipped twice along the patient's side and divided with  cautery. The gallbladder was then removed from the undersurface of the  liver with selective use of cautery. The gallbladder dissection did  require extended operative time given the acute inflammatory nature of  the process. Once freed, the gallbladder was placed in an EndoCatch bag  and retracted away. The right upper quadrant was suction irrigated  clear. Final inspection of the undersurface of the liver showed  excellent hemostasis. Clips were in place. No evidence of bile leak  again confirmed with intraoperative indocyanine green fluorescence. The  surgical robot was undocked and retracted away. Each port was removed  under direct vision again assuring hemostasis. The gallbladder was  removed through the umbilical port site and passed off as specimen. The  umbilical port site required inferior extension to accommodate the  distended gallbladder. The port site was then closed with a running 0  Vicryl suture. The umbilical skin edges were reapproximated with  interrupted 3-0 Vicryl suture, then all wounds were closed by  approximating skin edges with staples, covered with bacitracin and  sterile dressings. All sponge, needle, and instrument counts were  correct at the end of the case. The patient tolerated the procedure  well and was transferred to PACU in stable condition. SPECIMENS:  Gallbladder. DRAINS:  None. COMPLICATIONS:  None. DISPOSITION:  To PACU, extubated, awake, alert, and stable. Following  recovery, we will return the patient to the surgical floor with  continuation of IV fluids, antibiotics, and supportive care awaiting  return of GI function. Following that, we will discharge the patient  home with gradual advancement of diet and activity as tolerated with  instructions for no heavy lifting nor abdominal straining for the next  four weeks.   Followup will be with me in one to two weeks for staple  removal.        TREVOR MARTEL: 07/14/2021 14:06:11       T: 07/14/2021 14:13:42     [id number]  Job#: [id number]     Doc#: [id number]    CC:  Chana Lujan

## 2021-07-15 NOTE — PLAN OF CARE
Problem: Pain:  Goal: Pain level will decrease  Description: Pain level will decrease  7/15/2021 0935 by Delbert Boudreaux RN  Outcome: Ongoing  7/14/2021 2244 by Manjit Casas RN  Outcome: Ongoing  Goal: Control of acute pain  Description: Control of acute pain  7/15/2021 0935 by Delbert Boudreaux RN  Outcome: Ongoing  7/14/2021 2244 by Manjit Casas RN  Outcome: Ongoing  Goal: Control of chronic pain  Description: Control of chronic pain  7/15/2021 0935 by Delbert Boudreaux RN  Outcome: Ongoing  7/14/2021 2244 by Manjit Casas RN  Outcome: Ongoing     Problem: Falls - Risk of:  Goal: Will remain free from falls  Description: Will remain free from falls  7/15/2021 0935 by Delbert Boudreaux RN  Outcome: Ongoing  7/14/2021 2244 by Manjit Casas RN  Outcome: Ongoing  Goal: Absence of physical injury  Description: Absence of physical injury  7/15/2021 0935 by Delbert Boudreaux RN  Outcome: Ongoing  7/14/2021 2244 by Manjit Casas RN  Outcome: Ongoing     Problem: HEMODYNAMIC STATUS  Goal: Patient has stable vital signs and fluid balance  7/15/2021 0935 by Delbert Boudreaux RN  Outcome: Ongoing  7/14/2021 2244 by Manjit Casas RN  Outcome: Ongoing     Problem: OXYGENATION/RESPIRATORY FUNCTION  Goal: Patient will achieve/maintain normal respiratory rate/effort  7/15/2021 0935 by Delbert Boudreaux RN  Outcome: Ongoing  7/14/2021 2244 by Manjit Casas RN  Outcome: Ongoing     Problem: MOBILITY  Goal: Early mobilization is achieved  7/15/2021 0935 by Delbert Boudreaux RN  Outcome: Ongoing  7/14/2021 2244 by Manjit Casas RN  Outcome: Ongoing     Problem: ELIMINATION  Goal: Elimination patterns are normal or improving  Description: Elimination patterns return to pre-surgery normal patterns  7/15/2021 0935 by Delbert Boudreaux RN  Outcome: Ongoing  7/14/2021 2244 by Manjit Casas RN  Outcome: Ongoing     Problem: SKIN INTEGRITY  Goal: Skin integrity is maintained or improved  7/15/2021 0935 by Delbert Boudreaux RN  Outcome: Ongoing  7/14/2021 2244 by Amaya Carias RN  Outcome: Ongoing     Problem: Nutrition  Goal: Optimal nutrition therapy  7/15/2021 0935 by Laura Reza RN  Outcome: Ongoing  7/15/2021 0801 by Remi Edmondson RD, LD  Outcome: Ongoing  Note: Nutrition Problem #1: Mild malnutrition  Intervention: Food and/or Nutrient Delivery: Modify Current Diet (advance diet to low fat)  Nutritional Goals: PO >75% meals on low fat diet  Education on low fat provided

## 2021-07-16 ENCOUNTER — CARE COORDINATION (OUTPATIENT)
Dept: CASE MANAGEMENT | Age: 76
End: 2021-07-16

## 2021-07-16 LAB — SURGICAL PATHOLOGY REPORT: NORMAL

## 2021-07-16 NOTE — CARE COORDINATION
[facility] Transitions Initial Follow Up Call- Sylvester Kennedy MD- one time call     Call within 2 business days of discharge: Yes    Patient: Diogenes Hawkinsing Patient : 1945   MRN: <[id number]>  Reason for Admission: lap robotic cristina   Discharge Date: 7/15/21 RARS: Readmission Risk Score: 8      Last Discharge [facility]       Complaint Diagnosis Description Type Department Provider    21  S/P laparoscopic cholecystectomy Admission (Discharged) Jarrod Winslow MD           Spoke with: Billie Hinojosa     Call to pt who states she is doing pretty well   States she is sore but pain is well controlled. States she took pain pill this morning at 0800  States fair appetite and sticking with bland foods. Denies redness, drainage or swelling on incisions, fever, nausea  States understanding signs of infection to notify surgeon about   States no BM since surgery but she has not had much food since but she is passing gas  Writer informs this is final ([facility]) phone call- v/u. Encouraged call writer/ [facility] or providers if questions or concerns- v/u. Episode closed       Transitions of Care Initial Call    Was this an external facility discharge? No Discharge Facility: [facility]     Challenges to be reviewed by the provider   Additional needs identified to be addressed with provider: No  none             Method of communication with provider : none      Advance Care Planning:   Does patient have an Advance Directive: reviewed and current. Was this a readmission? No  Patient stated reason for admission: gall bladder  Patients top risk factors for readmission: medical condition-s/p cristina     Care Transition Nurse (CTN) contacted the patient by telephone to perform post hospital discharge assessment. Verified name and  with patient as identifiers. Provided introduction to self, and explanation of the CTN role.      CTN reviewed discharge instructions, medical action plan and red flags with patient who verbalized understanding. Patient given an opportunity to ask questions and does not have any further questions or concerns at this time. Were discharge instructions available to patient? Yes. Reviewed appropriate site of care based on symptoms and resources available to patient including: PCP, Specialist and When to call 911. The patient agrees to contact the PCP office for questions related to their healthcare. Medication reconciliation was performed with patient, who verbalizes understanding of administration of home medications. Advised obtaining a 90-day supply of all daily and as-needed medications. Covid Risk Education     Educated patient about risk for severe COVID-19 due to risk factors according to CDC guidelines. CTN reviewed discharge instructions, medical action plan and red flag symptoms with the patient who verbalized understanding. Discussed COVID vaccination status: Yes and has had both vaccine shots. Education provided on COVID-19 vaccination as appropriate. Discussed exposure protocols and quarantine with CDC Guidelines. Patient was given an opportunity to verbalize any questions and concerns and agrees to contact CTN or health care provider for questions related to their healthcare. Reviewed and educated patient on any new and changed medications related to discharge diagnosis. Was patient discharged with a pulse oximeter? No Discussed and confirmed pulse oximeter discharge instructions and when to notify provider or seek emergency care. CTN provided contact information. No further follow-up call indicated based on severity of symptoms and risk factors.         Non-face-to-face services provided:  Scheduled appointment with Specialist-confirms appts with Dr Gisela Harrington 7/19 & 7/26  Obtained and reviewed discharge summary and/or continuity of care documents  Assessment and support for treatment adherence and medication management-confirms new meds    Care Transitions 24 Hour Call Do you have any ongoing symptoms?: No  Do you have a copy of your discharge instructions?: Yes  Do you have all of your prescriptions and are they filled?: Yes  Have you been contacted by a [address]?: No  Have you scheduled your follow up appointment?: Yes  How are you going to get to your appointment?: Car - family or friend to transport  Were you discharged with any Home Care or Post Acute Services: No  Do you feel like you have everything you need to keep you well at home?: Yes  Care Transitions Interventions         Follow Up  Future Appointments   Date Time Provider Bonita Hartman   7/19/2021  2:15 PM Andi Black MD [facility]       Anna Dudley RN

## 2021-07-19 ENCOUNTER — OFFICE VISIT (OUTPATIENT)
Dept: SURGERY | Age: 76
End: 2021-07-19

## 2021-07-19 VITALS
WEIGHT: 176 LBS | HEART RATE: 90 BPM | TEMPERATURE: 98.1 F | HEIGHT: 70 IN | RESPIRATION RATE: 18 BRPM | BODY MASS INDEX: 25.2 KG/M2

## 2021-07-19 DIAGNOSIS — Z90.49 S/P LAPAROSCOPIC CHOLECYSTECTOMY: Primary | ICD-10-CM

## 2021-07-19 DIAGNOSIS — K85.10 GALLSTONE PANCREATITIS: ICD-10-CM

## 2021-07-19 DIAGNOSIS — K81.0 GANGRENOUS CHOLECYSTITIS: ICD-10-CM

## 2021-07-19 PROCEDURE — 99024 POSTOP FOLLOW-UP VISIT: CPT | Performed by: SURGERY

## 2021-07-19 ASSESSMENT — ENCOUNTER SYMPTOMS
SORE THROAT: 0
BLOOD IN STOOL: 0
ABDOMINAL PAIN: 0
NAUSEA: 0
COUGH: 0
SHORTNESS OF BREATH: 0
BACK PAIN: 0
TROUBLE SWALLOWING: 0
CHOKING: 0
VOMITING: 0

## 2021-07-19 NOTE — PROGRESS NOTES
Clifton Gonzalez MD  General Surgery, Endoscopy  Chief Medical Officer    [facility]  [address] [address]  Dept: [phone]  Fax: [phone]    CHIEF COMPLAINT  No chief complaint on file. HPI    Ms Gustavo Aguirre returns for follow-up after cholecystectomy. DATE OF PROCEDURE:  07/14/2021     ATTENDING SURGEON:  Dr. Sascha Ye.     PRIMARY CARE PHYSICIAN:  Kurt Dawn.     PREOPERATIVE DIAGNOSES:  1. Acute cholecystitis with cholelithiasis. 2.  Gallstone pancreatitis.     POSTOPERATIVE DIAGNOSES:  1.  Gangrenous cholecystitis with hydrops, cholelithiasis. 2.  Gallstone pancreatitis.     OPERATION PERFORMED:  Laparoscopic cholecystectomy, robotic assist, difficult    She is doing well. Original pain has resolved. Tolerating diet. Review of Systems   Constitutional: Negative for activity change, appetite change, chills, fever and unexpected weight change. HENT: Negative for nosebleeds, sneezing, sore throat and trouble swallowing. Eyes: Negative for visual disturbance. Respiratory: Negative for cough, choking and shortness of breath. Cardiovascular: Negative for chest pain, palpitations and leg swelling. Gastrointestinal: Negative for abdominal pain, blood in stool, nausea and vomiting. Genitourinary: Negative for dysuria, flank pain and hematuria. Musculoskeletal: Positive for arthralgias. Negative for back pain, gait problem and myalgias. Allergic/Immunologic: Negative for immunocompromised state. Neurological: Negative for dizziness, seizures, syncope, weakness and headaches. Hematological: Does not bruise/bleed easily. Psychiatric/Behavioral: Negative for confusion and sleep disturbance.        Past Medical History:   Diagnosis Date    Hypertension     Thyroid disease        Past Surgical History:   Procedure Laterality Date    APPENDECTOMY      CHOLECYSTECTOMY  07/14/2021    CHOLECYSTECTOMY LAPAROSCOPIC ROBOTIC ASSIST  CHOLECYSTECTOMY, LAPAROSCOPIC N/A 7/14/2021    CHOLECYSTECTOMY LAPAROSCOPIC ROBOTIC ASSIST performed by June Hassan MD at [address]         No family history on file. Allergies:  See list    Current Outpatient Medications   Medication Sig Dispense Refill    metoprolol succinate (TOPROL XL) 25 MG extended release tablet Take 1 tablet by mouth daily 30 tablet 3    potassium chloride (KLOR-CON M) 20 MEQ extended release tablet Take 2 tablets by mouth daily for 10 days 10 tablet 1    ibuprofen (ADVIL;MOTRIN) 200 MG tablet Take 200 mg by mouth every 6 hours as needed for Pain      amLODIPine (NORVASC) 10 MG tablet Take 10 mg by mouth daily      lisinopril (PRINIVIL;ZESTRIL) 10 MG tablet Take 10 mg by mouth daily      levothyroxine (SYNTHROID) 125 MCG tablet Take 125 mcg by mouth Daily      thyroid (ARMOUR) 30 MG tablet Take 30 mg by mouth daily       No current facility-administered medications for this visit. Social History     Socioeconomic History    Marital status:      Spouse name: Not on file    Number of children: Not on file    Years of education: Not on file    Highest education level: Not on file   Occupational History    Not on file   Tobacco Use    Smoking status: Never Smoker    Smokeless tobacco: Never Used   Substance and Sexual Activity    Alcohol use: Not on file    Drug use: Not on file    Sexual activity: Not on file   Other Topics Concern    Not on file   Social History Narrative    Not on file     Social Determinants of Health     Financial Resource Strain:     Difficulty of Paying Living Expenses:    Food Insecurity:     Worried About Running Out of Food in the Last Year:     [address] in the Last Year:    Transportation Needs:     Lack of Transportation (Medical):      Lack of Transportation (Non-Medical):    Physical Activity:     Days of Exercise per Week:     Minutes of Exercise per Session:    Stress:  Feeling of Stress :    Social Connections:     Frequency of Communication with Friends and Family:     Frequency of Social Gatherings with Friends and Family:     Attends Faith Services:     Active Member of Clubs or Organizations:     Attends Club or Organization Meetings:     Marital Status:    Intimate Partner Violence:     Fear of Current or Ex-Partner:     Emotionally Abused:     Physically Abused:     Sexually Abused: There were no vitals taken for this visit. Physical Exam  Vitals and nursing note reviewed. Constitutional:       General: She is not in acute distress. Appearance: She is well-developed. HENT:      Head: Normocephalic and atraumatic. Eyes:      General: No scleral icterus. Conjunctiva/sclera: Conjunctivae normal.      Pupils: Pupils are equal, round, and reactive to light. Neck:      Trachea: No tracheal deviation. Cardiovascular:      Rate and Rhythm: Normal rate. Pulmonary:      Effort: Pulmonary effort is normal. No respiratory distress. Abdominal:      General: Abdomen is flat. There is no distension. Palpations: Abdomen is soft. Tenderness: There is no abdominal tenderness. Hernia: No hernia is present. Comments: Wounds are clean, dry, intact. Skin:     General: Skin is warm and dry. Neurological:      Mental Status: She is alert and oriented to person, place, and time. Psychiatric:         Behavior: Behavior normal.         Thought Content: Thought content normal.         Judgment: Judgment normal.         IMAGING/LABS    7/16/2021 11:49 AM - Michael Wong Incoming Lab Results From [facility]    Component Collected Lab   Surgical Pathology Report 07/14/2021  9:[address]  [address]   -- Diagnosis --   GALLBLADDER, CHOLECYSTECTOMY:   - ACUTE GANGRENOUS CHOLECYSTITIS.   - CHOLELITHIASIS.        Shay Haywood ClickElectronically Signed Out**         sls/7/16/2021         Clinical Information   Pre-op Diagnosis:  ACUTE PANCREATITIS SECONDARY TO CHOLELITHIASIS WITH   CHOLECYSTITIS   Operative Findings:  [id number]   Operation Performed:  LAPAROSCOPIC CHOLECYSTECTOMY     Source of Specimen   1: GALLBLADDER     Gross Description   \"JIN Emmanuel" 10.0 x 7.0 x 6.8 cm intact gallbladder   with a 1.5 cm long x 1.0 cm in diameter cystic duct.  The serosa is   markedly fatty with a patchy exudate, and the liver bed is coarse   tan-brown.  The wall ranges in thickness from 0.1 to 0.3 cm and is   focally edematous.  There is necrosis within the surrounding fat.  The   mucosa is tan and the lumen contains numerous faceted yellow-white   calculi, 7.0 x 6.0 x 3.0 cm in aggregate.  Cystic duct margin and   sections of gallbladder mucosa 1cs.  tm       Microscopic Description   Histologic sections of the gallbladder wall demonstrate transmural   acute gangrenous necrosis and intramural edema with associated   necrosis in the attached fat.  Features of pre-existing chronic   cholecystitis are present.  The cystic duct margin appears viable. SURGICAL PATHOLOGY CONSULTATION         Patient Name: Edna Bell: [id number]   Path Number: [id number]     [address], [facility] [address]. [place], [address]   [phone]   Fax: [phone]    Testing Performed By    Christian Abdi Name Director Address Valid Date Range   [address]  [address] [address] 08/30/17 0801-Present   Lab and Collection    Surgical Pathology - 7/15/2021      ASSESSMENT     Diagnosis Orders   1. S/P laparoscopic cholecystectomy     2. Gallstone pancreatitis     3. Gangrenous cholecystitis         PLAN    Ms Earlene Martinez is doing well.   She is now postop day 5, following laparoscopic cholecystectomy, robotic assist.  She presented with gallstone pancreatitis and gangrenous cholecystitis. Tolerating diet. Continue gradual advancement of diet and activity as tolerated with no heavy lifting or abdominal straining for the next month.   Follow-up in 1 week for staple removal.     Fredi Johnson MD

## 2021-07-19 NOTE — DISCHARGE SUMMARY
Physician Discharge Summary     Patient ID:  Sita Mejia  [id number]  59 y.o.  1945    Admit date: 7/13/2021    Discharge date and time: 7/15/2021  2:45 PM     Admitting Physician: Clifton Gonzalez MD     Discharge Physician: Clifton Gonzalez MD     Admission Diagnoses: Acute cholecystitis [K81.0] , Gallstone pancreatitis    Discharge Diagnoses:  Gangrenous cholecystitis, with hydrops     Admission Condition: good    Discharged Condition: good    Indication for Admission:  As above. Hospital Course: Ms Gustavo Aguirre was admitted through [facility] ER July 10, 2021 with gallstone pancreatitis acute cholecystitis and cholelithiasis. Lipase on admission greater than 3000. Over 4 days of bowel rest, IV fluids and supportive care her enzymes normalized. She had a persistent leukocytosis of 13,000. She was transferred to [facility] and underwent a laparoscopic cholecystectomy, robotic assist July 14, 2021. The gallbladder was necrotic with hydrops. Her postoperative course was unremarkable. She had rapid return of GI function. Diet and activity were advanced and tolerated. She was discharged home on postop day 1 in good condition. Please refer to chart for further detail. Consults:  Medicine    Significant Diagnostic Studies:  Labs, CT abd/pelvis  See chart.     Treatments:  Lap cristina, IVF, antibiotics, etc.    Discharge Exam:  /77   Pulse 78   Temp 98.6 °F (37 °C) (Temporal)   Resp 16   Ht 5' 10\" (1.778 m)   Wt 189 lb (85.7 kg)   SpO2 99%   BMI 27.12 kg/m²   General appearance: alert, appears stated age and cooperative  Lungs: clear to auscultation bilaterally  Abdomen: soft, non-tender; bowel sounds normal; no masses,  no organomegaly  Extremities: extremities normal, atraumatic, no cyanosis or edema  Skin: Skin color, texture, turgor normal. No rashes or lesions  Neurologic: Grossly normal    Disposition: home    In process/preliminary results:  Outstanding Order Results No orders found from 6/14/2021 to 7/14/2021. Patient Instructions:   Discharge Medication List as of 7/15/2021  2:27 PM      START taking these medications    Details   HYDROcodone-acetaminophen (NORCO) 5-325 MG per tablet Take 1 tablet by mouth every 6 hours as needed for Pain for up to 3 days. May take up to 2 tablets po every 6 hours as needed for pain., Disp-12 tablet, R-0Print         CONTINUE these medications which have NOT CHANGED    Details   levoFLOXacin (LEVAQUIN) 500 MG tablet Take 1 tablet by mouth daily for 5 days, Disp-5 tablet, R-0Normal      metoprolol succinate (TOPROL XL) 25 MG extended release tablet Take 1 tablet by mouth daily, Disp-30 tablet, R-3Normal      potassium chloride (KLOR-CON M) 20 MEQ extended release tablet Take 2 tablets by mouth daily for 10 days, Disp-10 tablet, R-1Normal      ibuprofen (ADVIL;MOTRIN) 200 MG tablet Take 200 mg by mouth every 6 hours as needed for PainHistorical Med      amLODIPine (NORVASC) 10 MG tablet Take 10 mg by mouth dailyHistorical Med      lisinopril (PRINIVIL;ZESTRIL) 10 MG tablet Take 10 mg by mouth dailyHistorical Med      levothyroxine (SYNTHROID) 125 MCG tablet Take 125 mcg by mouth DailyHistorical Med      thyroid (ARMOUR) 30 MG tablet Take 30 mg by mouth dailyHistorical Med           Activity: no lifting nor abdominal straining nor strenuous exercise for 4 weeks. Diet: regular diet  Wound Care: as directed    Follow-up with Dr Dao Llanos in 1 week.     Signed:  Gopi Garcia MD   7/19/2021  10:17 AM

## 2021-07-19 NOTE — LETTER
[facility] - [address] [address]  Phone: [phone]  Fax: [phone]    Fredi Johnson MD    July 19, 2021     [facility] [id number]    Patient: Carol Ann Rico   MR Number: [id number]   YOB: 1945   Date of Visit: 7/19/2021       Dear [facility]:    Thank you for referring Rocio Grove to me for evaluation/treatment. Below are the relevant portions of my assessment and plan of care. ASSESSMENT     Diagnosis Orders   1. S/P laparoscopic cholecystectomy     2. Gallstone pancreatitis     3. Gangrenous cholecystitis         PLAN    Ms Cherie Rankin is doing well. She is now postop day 5, following laparoscopic cholecystectomy, robotic assist.  She presented with gallstone pancreatitis and gangrenous cholecystitis. Tolerating diet. Continue gradual advancement of diet and activity as tolerated with no heavy lifting or abdominal straining for the next month. Follow-up in 1 week for staple removal.     If you have questions, please do not hesitate to call me. I look forward to following Debby Fairly along with you.     Sincerely,    MD Fredi Vegas MD

## 2021-07-27 ENCOUNTER — OFFICE VISIT (OUTPATIENT)
Dept: SURGERY | Age: 76
End: 2021-07-27

## 2021-07-27 DIAGNOSIS — Z90.49 STATUS POST CHOLECYSTECTOMY: Primary | ICD-10-CM

## 2021-07-27 PROCEDURE — 99024 POSTOP FOLLOW-UP VISIT: CPT | Performed by: SURGERY

## 2021-07-27 NOTE — PROGRESS NOTES
Patient returned to [facility] surgery office for staple removal. Status post laparoscopic cholecystectomy 07/14/21. POD 13. Staples removed. Incisions clean, dry and intact. Patient without complaints. Encouraged patient to contact office with any questions or concerns.

## (undated) DEVICE — INSUFFLATION NEEDLE TO ESTABLISH PNEUMOPERITONEUM.: Brand: INSUFFLATION NEEDLE

## (undated) DEVICE — BLADELESS OBTURATOR: Brand: WECK VISTA

## (undated) DEVICE — CANNULA SEAL

## (undated) DEVICE — GLOVE ORANGE PI 7 1/2   MSG9075

## (undated) DEVICE — DRESSING SURESITE-123PLUSPAD 2.4 IN X2.8 IN

## (undated) DEVICE — PLUMEPORT LAPAROSCOPIC SMOKE FILTRATION DEVICE: Brand: PLUMEPORT ACTIV

## (undated) DEVICE — PENCIL ES L3M BTTN SWCH HOLSTER W/ BLDE ELECTRD EDGE

## (undated) DEVICE — SOLUTION IV IRRIG POUR BRL 0.9% SODIUM CHL 2F7124

## (undated) DEVICE — SYRINGE MED 10ML LUERLOCK TIP W/O SFTY DISP

## (undated) DEVICE — SUTURE SZ 0 27IN 5/8 CIR UR-6  TAPER PT VIOLET ABSRB VICRYL J603H

## (undated) DEVICE — ARM DRAPE

## (undated) DEVICE — 3M™ TEGADERM™ +PAD FILM DRESSING WITH NON-ADHERENT PAD, 3587, 3-1/2 IN X 4-1/8 IN (9 CM X 10.5 CM), 25/CAR, 4 CAR/CS: Brand: 3M™ TEGADERM™

## (undated) DEVICE — 3M™ PRECISE™ VISTA DISPOSABLE SKIN STAPLER 3995: Brand: 3M™ PRECISE™

## (undated) DEVICE — BAG SPEC REM 224ML W4XL6IN DIA10MM 1 HND GYN DISP ENDOPCH

## (undated) DEVICE — SOLUTION IV 1000ML 0.9% SOD CHL FOR IRRIG PLAS CONT

## (undated) DEVICE — BLADELESS OBTURATOR, LONG: Brand: WECK VISTA

## (undated) DEVICE — SUCTION IRRIGATOR: Brand: ENDOWRIST

## (undated) DEVICE — SUTURE VCRL + SZ 3-0 L27IN ABSRB UD L26MM SH 1/2 CIR VCP416H

## (undated) DEVICE — WARMER SCP 2 ANTIFOG LAP DISP

## (undated) DEVICE — TROCARS: Brand: KII® BALLOON BLUNT TIP SYSTEM

## (undated) DEVICE — 40595 XL TRENDELENBURG POSITIONING KIT: Brand: 40595 XL TRENDELENBURG POSITIONING KIT

## (undated) DEVICE — Device

## (undated) DEVICE — Z DISCONTINUED USE 2272114 DRAPE SURG UTIL 26X15 IN W/ TAPE N INVASIVE MULTLYR DISP